# Patient Record
Sex: MALE | Race: WHITE | NOT HISPANIC OR LATINO | Employment: FULL TIME | URBAN - METROPOLITAN AREA
[De-identification: names, ages, dates, MRNs, and addresses within clinical notes are randomized per-mention and may not be internally consistent; named-entity substitution may affect disease eponyms.]

---

## 2023-04-17 ENCOUNTER — TELEPHONE (OUTPATIENT)
Dept: PRIMARY CARE | Facility: CLINIC | Age: 78
End: 2023-04-17
Payer: COMMERCIAL

## 2023-04-17 PROBLEM — I10 BENIGN ESSENTIAL HTN: Status: ACTIVE | Noted: 2023-04-17

## 2023-04-17 NOTE — TELEPHONE ENCOUNTER
Pt stopped into office this afternoon and needs a refill on-    Amlodipine Besylate- 10mg-3 90 days refills, 1 tablet by mouth daily     [facility] on [address] In [place]. [phone]  Thank you!

## 2023-04-18 ENCOUNTER — TELEPHONE (OUTPATIENT)
Dept: PRIMARY CARE | Facility: CLINIC | Age: 78
End: 2023-04-18
Payer: COMMERCIAL

## 2023-04-18 DIAGNOSIS — R80.9 MICROALBUMINURIA: ICD-10-CM

## 2023-04-18 DIAGNOSIS — I10 BENIGN ESSENTIAL HTN: Primary | ICD-10-CM

## 2023-04-18 RX ORDER — SILDENAFIL 50 MG/1
50 TABLET, FILM COATED ORAL AS NEEDED
COMMUNITY
Start: 2022-11-14 | End: 2023-06-19 | Stop reason: SDUPTHER

## 2023-04-18 RX ORDER — POTASSIUM CHLORIDE 750 MG/1
1 TABLET, EXTENDED RELEASE ORAL 2 TIMES DAILY
COMMUNITY
Start: 2023-01-12 | End: 2023-06-19 | Stop reason: SDUPTHER

## 2023-04-18 RX ORDER — VARDENAFIL HYDROCHLORIDE 10 MG/1
20 TABLET ORAL AS NEEDED
COMMUNITY

## 2023-04-18 RX ORDER — AMLODIPINE BESYLATE 10 MG/1
10 TABLET ORAL DAILY
Qty: 90 TABLET | Refills: 0 | Status: SHIPPED | OUTPATIENT
Start: 2023-04-18 | End: 2023-06-19 | Stop reason: SDUPTHER

## 2023-04-18 RX ORDER — LOSARTAN POTASSIUM 100 MG/1
100 TABLET ORAL DAILY
COMMUNITY
End: 2023-06-19 | Stop reason: SDUPTHER

## 2023-04-18 RX ORDER — HYDROCHLOROTHIAZIDE 25 MG/1
25 TABLET ORAL DAILY
COMMUNITY
End: 2023-06-19 | Stop reason: SDUPTHER

## 2023-04-18 RX ORDER — ATORVASTATIN CALCIUM 40 MG/1
40 TABLET, FILM COATED ORAL DAILY
COMMUNITY
End: 2023-05-02 | Stop reason: SDUPTHER

## 2023-04-18 RX ORDER — AMLODIPINE BESYLATE 10 MG/1
10 TABLET ORAL DAILY
COMMUNITY
End: 2023-04-18 | Stop reason: SDUPTHER

## 2023-04-18 NOTE — TELEPHONE ENCOUNTER
Summer, please schedule the pt for 30 minutes. He needs blood work done.  Schedule a follow up appointment per Lenin.Thanks

## 2023-04-19 NOTE — TELEPHONE ENCOUNTER
Pt notified of the message. He needs a follow up appointment.  I made a mistake with the note.   Summer, can you call to schedule him? Thanks.

## 2023-04-20 RX ORDER — AMLODIPINE BESYLATE 10 MG/1
10 TABLET ORAL DAILY
Qty: 90 TABLET | Refills: 0 | OUTPATIENT
Start: 2023-04-20

## 2023-04-20 NOTE — TELEPHONE ENCOUNTER
Can you sign the order that came across for this pt? I know th rx was sent but it  S coming up that it wasn't signed.

## 2023-05-01 DIAGNOSIS — E78.2 MIXED HYPERLIPIDEMIA: Primary | ICD-10-CM

## 2023-05-01 NOTE — TELEPHONE ENCOUNTER
Summer, can you call the pt to schedule him for an appointment? He needs bllodwork done.  Thanks.

## 2023-05-02 RX ORDER — ATORVASTATIN CALCIUM 40 MG/1
40 TABLET, FILM COATED ORAL DAILY
Qty: 30 TABLET | Refills: 1 | Status: SHIPPED | OUTPATIENT
Start: 2023-05-02 | End: 2023-06-19 | Stop reason: SDUPTHER

## 2023-05-17 ENCOUNTER — TELEPHONE (OUTPATIENT)
Dept: PRIMARY CARE | Facility: CLINIC | Age: 78
End: 2023-05-17
Payer: COMMERCIAL

## 2023-05-17 NOTE — TELEPHONE ENCOUNTER
Patient is going to [facility] and per his insurance it says he needs a referral Please.

## 2023-05-18 DIAGNOSIS — Z91.89 NEED FOR DENTAL CARE: Primary | ICD-10-CM

## 2023-06-06 ENCOUNTER — LAB (OUTPATIENT)
Dept: LAB | Facility: LAB | Age: 78
End: 2023-06-06
Payer: COMMERCIAL

## 2023-06-06 DIAGNOSIS — R80.9 MICROALBUMINURIA: ICD-10-CM

## 2023-06-06 DIAGNOSIS — I10 BENIGN ESSENTIAL HTN: ICD-10-CM

## 2023-06-06 LAB
ALANINE AMINOTRANSFERASE (SGPT) (U/L) IN SER/PLAS: 34 U/L (ref 10–52)
ALBUMIN (G/DL) IN SER/PLAS: 4.4 G/DL (ref 3.4–5)
ALBUMIN (MG/L) IN URINE: 408.4 MG/L
ALBUMIN/CREATININE (UG/MG) IN URINE: 337.5 UG/MG CRT (ref 0–30)
ALKALINE PHOSPHATASE (U/L) IN SER/PLAS: 63 U/L (ref 33–136)
ANION GAP IN SER/PLAS: 13 MMOL/L (ref 10–20)
ASPARTATE AMINOTRANSFERASE (SGOT) (U/L) IN SER/PLAS: 17 U/L (ref 9–39)
BILIRUBIN TOTAL (MG/DL) IN SER/PLAS: 1.7 MG/DL (ref 0–1.2)
CALCIUM (MG/DL) IN SER/PLAS: 10.2 MG/DL (ref 8.6–10.6)
CARBON DIOXIDE, TOTAL (MMOL/L) IN SER/PLAS: 27 MMOL/L (ref 21–32)
CHLORIDE (MMOL/L) IN SER/PLAS: 105 MMOL/L (ref 98–107)
CHOLESTEROL (MG/DL) IN SER/PLAS: 144 MG/DL (ref 0–199)
CHOLESTEROL IN HDL (MG/DL) IN SER/PLAS: 51.1 MG/DL
CHOLESTEROL/HDL RATIO: 2.8
CREATININE (MG/DL) IN SER/PLAS: 0.83 MG/DL (ref 0.5–1.3)
CREATININE (MG/DL) IN URINE: 121 MG/DL (ref 20–370)
GFR MALE: 90 ML/MIN/1.73M2
GLUCOSE (MG/DL) IN SER/PLAS: 87 MG/DL (ref 74–99)
LDL: 69 MG/DL (ref 0–99)
POTASSIUM (MMOL/L) IN SER/PLAS: 4 MMOL/L (ref 3.5–5.3)
PROTEIN TOTAL: 7 G/DL (ref 6.4–8.2)
SODIUM (MMOL/L) IN SER/PLAS: 141 MMOL/L (ref 136–145)
THYROTROPIN (MIU/L) IN SER/PLAS BY DETECTION LIMIT <= 0.05 MIU/L: 2.18 MIU/L (ref 0.44–3.98)
TRIGLYCERIDE (MG/DL) IN SER/PLAS: 120 MG/DL (ref 0–149)
UREA NITROGEN (MG/DL) IN SER/PLAS: 28 MG/DL (ref 6–23)
VLDL: 24 MG/DL (ref 0–40)

## 2023-06-06 PROCEDURE — 80053 COMPREHEN METABOLIC PANEL: CPT

## 2023-06-06 PROCEDURE — 82043 UR ALBUMIN QUANTITATIVE: CPT

## 2023-06-06 PROCEDURE — 36415 COLL VENOUS BLD VENIPUNCTURE: CPT

## 2023-06-06 PROCEDURE — 80061 LIPID PANEL: CPT

## 2023-06-06 PROCEDURE — 84443 ASSAY THYROID STIM HORMONE: CPT

## 2023-06-06 PROCEDURE — 82570 ASSAY OF URINE CREATININE: CPT

## 2023-06-19 ENCOUNTER — OFFICE VISIT (OUTPATIENT)
Dept: PRIMARY CARE | Facility: CLINIC | Age: 78
End: 2023-06-19
Payer: COMMERCIAL

## 2023-06-19 VITALS
SYSTOLIC BLOOD PRESSURE: 138 MMHG | BODY MASS INDEX: 31.21 KG/M2 | HEART RATE: 61 BPM | DIASTOLIC BLOOD PRESSURE: 72 MMHG | WEIGHT: 218 LBS | HEIGHT: 70 IN

## 2023-06-19 DIAGNOSIS — Z01.01: Primary | ICD-10-CM

## 2023-06-19 DIAGNOSIS — M79.672 FOOT PAIN, LEFT: ICD-10-CM

## 2023-06-19 DIAGNOSIS — Z01.00 EYE EXAM, ROUTINE: ICD-10-CM

## 2023-06-19 DIAGNOSIS — E78.2 MIXED HYPERLIPIDEMIA: ICD-10-CM

## 2023-06-19 DIAGNOSIS — E53.8 B12 DEFICIENCY: ICD-10-CM

## 2023-06-19 DIAGNOSIS — M79.5 FOREIGN BODY (FB) IN SOFT TISSUE: ICD-10-CM

## 2023-06-19 DIAGNOSIS — I10 BENIGN ESSENTIAL HTN: ICD-10-CM

## 2023-06-19 DIAGNOSIS — N52.9 VASCULOGENIC ERECTILE DYSFUNCTION, UNSPECIFIED VASCULOGENIC ERECTILE DYSFUNCTION TYPE: ICD-10-CM

## 2023-06-19 DIAGNOSIS — I10 BENIGN HYPERTENSION: ICD-10-CM

## 2023-06-19 PROCEDURE — 3078F DIAST BP <80 MM HG: CPT | Performed by: INTERNAL MEDICINE

## 2023-06-19 PROCEDURE — 1159F MED LIST DOCD IN RCRD: CPT | Performed by: INTERNAL MEDICINE

## 2023-06-19 PROCEDURE — 3075F SYST BP GE 130 - 139MM HG: CPT | Performed by: INTERNAL MEDICINE

## 2023-06-19 PROCEDURE — 1036F TOBACCO NON-USER: CPT | Performed by: INTERNAL MEDICINE

## 2023-06-19 PROCEDURE — 99214 OFFICE O/P EST MOD 30 MIN: CPT | Performed by: INTERNAL MEDICINE

## 2023-06-19 RX ORDER — SILDENAFIL 50 MG/1
50 TABLET, FILM COATED ORAL AS NEEDED
Qty: 30 TABLET | Refills: 3 | Status: SHIPPED | OUTPATIENT
Start: 2023-06-19 | End: 2024-06-07 | Stop reason: SDUPTHER

## 2023-06-19 RX ORDER — LOSARTAN POTASSIUM 100 MG/1
100 TABLET ORAL DAILY
Qty: 90 TABLET | Refills: 3 | Status: SHIPPED | OUTPATIENT
Start: 2023-06-19 | End: 2024-06-07 | Stop reason: SDUPTHER

## 2023-06-19 RX ORDER — POTASSIUM CHLORIDE 750 MG/1
10 TABLET, EXTENDED RELEASE ORAL 2 TIMES DAILY
Qty: 180 TABLET | Refills: 3 | Status: SHIPPED | OUTPATIENT
Start: 2023-06-19

## 2023-06-19 RX ORDER — AMLODIPINE BESYLATE 10 MG/1
10 TABLET ORAL DAILY
Qty: 90 TABLET | Refills: 3 | Status: SHIPPED | OUTPATIENT
Start: 2023-06-19 | End: 2024-06-07 | Stop reason: SDUPTHER

## 2023-06-19 RX ORDER — ASPIRIN 81 MG/1
81 TABLET ORAL ONCE
COMMUNITY

## 2023-06-19 RX ORDER — HYDROCHLOROTHIAZIDE 25 MG/1
25 TABLET ORAL DAILY
Qty: 90 TABLET | Refills: 3 | Status: SHIPPED | OUTPATIENT
Start: 2023-06-19 | End: 2024-06-07 | Stop reason: SDUPTHER

## 2023-06-19 RX ORDER — ATORVASTATIN CALCIUM 40 MG/1
40 TABLET, FILM COATED ORAL DAILY
Qty: 90 TABLET | Refills: 3 | Status: SHIPPED | OUTPATIENT
Start: 2023-06-19 | End: 2024-06-07 | Stop reason: SDUPTHER

## 2023-06-19 ASSESSMENT — PATIENT HEALTH QUESTIONNAIRE - PHQ9
1. LITTLE INTEREST OR PLEASURE IN DOING THINGS: NOT AT ALL
SUM OF ALL RESPONSES TO PHQ9 QUESTIONS 1 AND 2: 0
2. FEELING DOWN, DEPRESSED OR HOPELESS: NOT AT ALL

## 2023-06-19 ASSESSMENT — PAIN SCALES - GENERAL: PAINLEVEL: 0-NO PAIN

## 2023-06-19 NOTE — PROGRESS NOTES
"Subjective   Connor Song is a 77 y.o. male who presents for Follow-up (Follow up, meds).      He has lost weight was 243 ( he states his weight at home was lower)  His goal is 205.    He has protienuria is almost the same as last time    He occasionally takes gabapentin 300 for leg pain   He has been feeling well overall    He says he broke a glass and he got up barefoot to  the glass  He feels like there is something in his left foot near the lateral metatarsals  He would like a xray as he is still having pain at times    Review of Systems    Objective   /72 (BP Location: Right leg, Patient Position: Sitting, BP Cuff Size: Adult)   Pulse 61   Ht 1.778 m (5' 10\")   Wt 98.9 kg (218 lb)   BMI 31.28 kg/m²    Physical Exam  Visit Vitals  /72 (BP Location: Right leg, Patient Position: Sitting, BP Cuff Size: Adult)   Pulse 61   Ht 1.778 m (5' 10\")   Wt 98.9 kg (218 lb)   BMI 31.28 kg/m²   Smoking Status Never   BSA 2.21 m²      GEN: NAD  HEENT: normal  NECK: no adenopathy, no thyroid enlargment  LUNGS: CTAB  CV: reg S1/S2 no murmurs  EXT: no leg edema   Assessment/Plan   Problem List Items Addressed This Visit       Benign essential HTN    Relevant Medications    amLODIPine (Norvasc) 10 mg tablet          Foot pain, left        Relevant Orders    XR foot left 3+ views    Foreign body (FB) in soft tissue        Relevant Orders    XR foot left 3+ views    Benign hypertension    remain on current meds. BP well controlled.     Relevant Medications    potassium chloride CR 10 mEq ER tablet    losartan (Cozaar) 100 mg tablet    hydroCHLOROthiazide (HYDRODiuril) 25 mg tablet    Other Relevant Orders    Comprehensive metabolic panel    Lipid Panel    CBC and Auto Differential    B12 deficiency    he has a borderline low vitamin B12 level. Will repeat     Relevant Orders    Vitamin B12    Mixed hyperlipidemia    : remain on atorvastatin. Repeat lipids in Dec.     Relevant Medications    atorvastatin " (Lipitor) 40 mg tablet    Vasculogenic erectile dysfunction, unspecified vasculogenic erectile dysfunction type        Relevant Medications    sildenafil (Viagra) 50 mg tablet    He would like an eye exam so referral to ophtho completed.

## 2023-06-19 NOTE — PATIENT INSTRUCTIONS
See me again in December .  Will be your Medicare Wellness Visit.  Get blood test done after fasting close to next visit.

## 2023-07-20 ENCOUNTER — TELEPHONE (OUTPATIENT)
Dept: PRIMARY CARE | Facility: CLINIC | Age: 78
End: 2023-07-20
Payer: COMMERCIAL

## 2023-07-20 DIAGNOSIS — M54.16 LUMBAR RADICULOPATHY: Primary | ICD-10-CM

## 2023-07-20 NOTE — TELEPHONE ENCOUNTER
Pt needs a refill on Gabapentin 300 mg 1 capsule twice a day.   [facility] [address] In [place] [phone]  Pt # [phone]   Pt would like A 90 day supply.

## 2023-07-21 RX ORDER — GABAPENTIN 300 MG/1
300 CAPSULE ORAL 2 TIMES DAILY
Qty: 180 CAPSULE | Refills: 1 | Status: SHIPPED | OUTPATIENT
Start: 2023-07-21 | End: 2023-09-26 | Stop reason: SDUPTHER

## 2023-09-19 ENCOUNTER — OFFICE VISIT (OUTPATIENT)
Dept: PRIMARY CARE | Facility: CLINIC | Age: 78
End: 2023-09-19
Payer: COMMERCIAL

## 2023-09-19 VITALS
HEIGHT: 70 IN | SYSTOLIC BLOOD PRESSURE: 148 MMHG | WEIGHT: 225.8 LBS | BODY MASS INDEX: 32.33 KG/M2 | HEART RATE: 67 BPM | RESPIRATION RATE: 16 BRPM | DIASTOLIC BLOOD PRESSURE: 70 MMHG

## 2023-09-19 DIAGNOSIS — M65.351 TRIGGER LITTLE FINGER OF RIGHT HAND: Primary | ICD-10-CM

## 2023-09-19 DIAGNOSIS — M65.9 TENOSYNOVITIS OF FINGER: ICD-10-CM

## 2023-09-19 DIAGNOSIS — B07.0 PLANTAR WART: ICD-10-CM

## 2023-09-19 DIAGNOSIS — R80.9 ALBUMINURIA: ICD-10-CM

## 2023-09-19 DIAGNOSIS — Z00.00 ROUTINE GENERAL MEDICAL EXAMINATION AT HEALTH CARE FACILITY: ICD-10-CM

## 2023-09-19 DIAGNOSIS — Z13.89 ENCOUNTER FOR SCREENING FOR OTHER DISORDER: ICD-10-CM

## 2023-09-19 DIAGNOSIS — E78.2 MIXED HYPERLIPIDEMIA: ICD-10-CM

## 2023-09-19 DIAGNOSIS — I10 BENIGN ESSENTIAL HTN: ICD-10-CM

## 2023-09-19 PROBLEM — E78.5 HLD (HYPERLIPIDEMIA): Status: ACTIVE | Noted: 2023-09-19

## 2023-09-19 PROCEDURE — 3078F DIAST BP <80 MM HG: CPT | Performed by: INTERNAL MEDICINE

## 2023-09-19 PROCEDURE — 1036F TOBACCO NON-USER: CPT | Performed by: INTERNAL MEDICINE

## 2023-09-19 PROCEDURE — 1159F MED LIST DOCD IN RCRD: CPT | Performed by: INTERNAL MEDICINE

## 2023-09-19 PROCEDURE — G0439 PPPS, SUBSEQ VISIT: HCPCS | Performed by: INTERNAL MEDICINE

## 2023-09-19 PROCEDURE — 1126F AMNT PAIN NOTED NONE PRSNT: CPT | Performed by: INTERNAL MEDICINE

## 2023-09-19 PROCEDURE — 3077F SYST BP >= 140 MM HG: CPT | Performed by: INTERNAL MEDICINE

## 2023-09-19 PROCEDURE — 1160F RVW MEDS BY RX/DR IN RCRD: CPT | Performed by: INTERNAL MEDICINE

## 2023-09-19 PROCEDURE — 1170F FXNL STATUS ASSESSED: CPT | Performed by: INTERNAL MEDICINE

## 2023-09-19 PROCEDURE — 99213 OFFICE O/P EST LOW 20 MIN: CPT | Performed by: INTERNAL MEDICINE

## 2023-09-19 PROCEDURE — G0442 ANNUAL ALCOHOL SCREEN 15 MIN: HCPCS | Performed by: INTERNAL MEDICINE

## 2023-09-19 ASSESSMENT — ACTIVITIES OF DAILY LIVING (ADL)
DOING_HOUSEWORK: INDEPENDENT
GROCERY_SHOPPING: INDEPENDENT
DRESSING: INDEPENDENT
TAKING_MEDICATION: INDEPENDENT
MANAGING_FINANCES: INDEPENDENT
BATHING: INDEPENDENT

## 2023-09-19 ASSESSMENT — LIFESTYLE VARIABLES
HOW OFTEN DO YOU HAVE A DRINK CONTAINING ALCOHOL: MONTHLY OR LESS
HOW MANY STANDARD DRINKS CONTAINING ALCOHOL DO YOU HAVE ON A TYPICAL DAY: 1 OR 2
SKIP TO QUESTIONS 9-10: 1
HOW OFTEN DO YOU HAVE SIX OR MORE DRINKS ON ONE OCCASION: NEVER
AUDIT-C TOTAL SCORE: 1

## 2023-09-19 ASSESSMENT — PATIENT HEALTH QUESTIONNAIRE - PHQ9
SUM OF ALL RESPONSES TO PHQ9 QUESTIONS 1 AND 2: 0
1. LITTLE INTEREST OR PLEASURE IN DOING THINGS: NOT AT ALL
2. FEELING DOWN, DEPRESSED OR HOPELESS: NOT AT ALL

## 2023-09-19 ASSESSMENT — PAIN SCALES - GENERAL: PAINLEVEL: 0-NO PAIN

## 2023-09-19 NOTE — PROGRESS NOTES
"Subjective   Connor Song is a 78 y.o. male who presents for Follow-up (Pt has complaint of hard lump on the bottom of left foot. Xray was normal. Has a small raised area on his head possibly a pimple. ). Also can complete Medicare Wellness visit today    He had a hard lump at the top of his scalp that has improved but     His right index finger is locking, not closing   He had surgery for dupuytren's contracture in his left hand    His left foot has a tender area near the MTP of his great toe  Has had no drainage    He says that rossy has been needing my authorization to dispense refills even when there are refills on his meds.     He does have a HCPOA    He is leaving for [place] and will not be back until end November  Review of Systems    Objective   /70 (BP Location: Left arm, Patient Position: Sitting, BP Cuff Size: Adult)   Pulse 67   Resp 16   Ht 1.778 m (5' 10\")   Wt 102 kg (225 lb 12.8 oz)   BMI 32.40 kg/m²    Physical Exam  Visit Vitals  /70 (BP Location: Left arm, Patient Position: Sitting, BP Cuff Size: Adult)   Pulse 67   Resp 16   Ht 1.778 m (5' 10\")   Wt 102 kg (225 lb 12.8 oz)   BMI 32.40 kg/m²   Smoking Status Never   BSA 2.24 m²      GEN: NAD  HEENT: normal  NECK: no adenopathy, no thyroid enlargment  LUNGS: CTAB  CV: reg S1/S2 no murmurs  EXT: no leg edema   He has a wart on the sole of his left foot, in the left distal metatarsal area  There is no drainage, ulceration or  cellulitis  Assessment/Plan   Problem List Items Addressed This Visit       Benign essential HTN remain on amlodipine 10 mg , losartan 100     Relevant Orders    Comprehensive Metabolic Panel    Lipid Panel    CBC and Auto Differential    Albumin , Urine Random    Creatinine, Urine Random    Albuminuria    Relevant Orders    Albumin , Urine Random    Albumin , Urine Random    HLD (hyperlipidemia) remain on lipitor 40.     Relevant Orders    Lipid Panel     Other Visit Diagnoses       Trigger " little finger of right hand        Relevant Orders    Referral to Orthopaedic Surgery    Plantar Wart:  recommend discussing this with dermatologist.             Routine general medical examination at health care facility    medicare wellness visit completed today

## 2023-09-19 NOTE — PATIENT INSTRUCTIONS
Try using an otc wart remover on the area of your foot that is hurting.  Dermatology would also be able to address this.    See me again in 6 months.   Get blood test and urine test done close to next visit, after fasting.   Get blood test done after fasting overnight.  The  lab is on the first floor.  Lab hours are 7 am to 4 pm Mon-Fri and 8am to Noon on Saturday.  No appt is needed.  Orders are entered into the system so you do not need a paper order.

## 2023-09-26 ENCOUNTER — TELEPHONE (OUTPATIENT)
Dept: PRIMARY CARE | Facility: CLINIC | Age: 78
End: 2023-09-26
Payer: COMMERCIAL

## 2023-09-26 DIAGNOSIS — M54.16 LUMBAR RADICULOPATHY: ICD-10-CM

## 2023-09-26 RX ORDER — GABAPENTIN 300 MG/1
300 CAPSULE ORAL 2 TIMES DAILY
Qty: 180 CAPSULE | Refills: 3 | Status: SHIPPED | OUTPATIENT
Start: 2023-09-26 | End: 2024-05-28 | Stop reason: ALTCHOICE

## 2023-09-26 RX ORDER — GABAPENTIN 300 MG/1
300 CAPSULE ORAL 2 TIMES DAILY
Qty: 180 CAPSULE | Refills: 1 | Status: CANCELLED | OUTPATIENT
Start: 2023-09-26

## 2023-10-02 PROBLEM — N52.9 ERECTILE DYSFUNCTION: Status: ACTIVE | Noted: 2023-10-02

## 2023-10-02 PROBLEM — M54.16 LUMBAR RADICULAR PAIN: Status: ACTIVE | Noted: 2023-10-02

## 2023-11-14 ENCOUNTER — TELEPHONE (OUTPATIENT)
Dept: PRIMARY CARE | Facility: CLINIC | Age: 78
End: 2023-11-14
Payer: COMMERCIAL

## 2023-11-15 ENCOUNTER — APPOINTMENT (OUTPATIENT)
Dept: PRIMARY CARE | Facility: CLINIC | Age: 78
End: 2023-11-15
Payer: COMMERCIAL

## 2023-11-29 ENCOUNTER — APPOINTMENT (OUTPATIENT)
Dept: DERMATOLOGY | Facility: CLINIC | Age: 78
End: 2023-11-29
Payer: COMMERCIAL

## 2023-12-04 ENCOUNTER — APPOINTMENT (OUTPATIENT)
Dept: PRIMARY CARE | Facility: CLINIC | Age: 78
End: 2023-12-04
Payer: COMMERCIAL

## 2023-12-06 ENCOUNTER — LAB (OUTPATIENT)
Dept: LAB | Facility: LAB | Age: 78
End: 2023-12-06
Payer: COMMERCIAL

## 2023-12-06 DIAGNOSIS — R80.9 ALBUMINURIA: ICD-10-CM

## 2023-12-06 DIAGNOSIS — I10 BENIGN ESSENTIAL HTN: ICD-10-CM

## 2023-12-06 DIAGNOSIS — E53.8 B12 DEFICIENCY: ICD-10-CM

## 2023-12-06 DIAGNOSIS — E78.2 MIXED HYPERLIPIDEMIA: ICD-10-CM

## 2023-12-06 DIAGNOSIS — I10 BENIGN HYPERTENSION: ICD-10-CM

## 2023-12-06 LAB
ALBUMIN SERPL BCP-MCNC: 4.6 G/DL (ref 3.4–5)
ALP SERPL-CCNC: 66 U/L (ref 33–136)
ALT SERPL W P-5'-P-CCNC: 32 U/L (ref 10–52)
ANION GAP SERPL CALC-SCNC: 17 MMOL/L (ref 10–20)
AST SERPL W P-5'-P-CCNC: 18 U/L (ref 9–39)
BASOPHILS # BLD AUTO: 0.05 X10*3/UL (ref 0–0.1)
BASOPHILS NFR BLD AUTO: 0.8 %
BILIRUB SERPL-MCNC: 1.7 MG/DL (ref 0–1.2)
BUN SERPL-MCNC: 28 MG/DL (ref 6–23)
CALCIUM SERPL-MCNC: 10.3 MG/DL (ref 8.6–10.6)
CHLORIDE SERPL-SCNC: 102 MMOL/L (ref 98–107)
CHOLEST SERPL-MCNC: 168 MG/DL (ref 0–199)
CHOLESTEROL/HDL RATIO: 3
CO2 SERPL-SCNC: 27 MMOL/L (ref 21–32)
CREAT SERPL-MCNC: 0.95 MG/DL (ref 0.5–1.3)
CREAT UR-MCNC: 124.9 MG/DL (ref 20–370)
EOSINOPHIL # BLD AUTO: 0.18 X10*3/UL (ref 0–0.4)
EOSINOPHIL NFR BLD AUTO: 3 %
ERYTHROCYTE [DISTWIDTH] IN BLOOD BY AUTOMATED COUNT: 12.7 % (ref 11.5–14.5)
GFR SERPL CREATININE-BSD FRML MDRD: 82 ML/MIN/1.73M*2
GLUCOSE SERPL-MCNC: 87 MG/DL (ref 74–99)
HCT VFR BLD AUTO: 48.7 % (ref 41–52)
HDLC SERPL-MCNC: 56.7 MG/DL
HGB BLD-MCNC: 16.5 G/DL (ref 13.5–17.5)
IMM GRANULOCYTES # BLD AUTO: 0.02 X10*3/UL (ref 0–0.5)
IMM GRANULOCYTES NFR BLD AUTO: 0.3 % (ref 0–0.9)
LDLC SERPL CALC-MCNC: 82 MG/DL
LYMPHOCYTES # BLD AUTO: 1.71 X10*3/UL (ref 0.8–3)
LYMPHOCYTES NFR BLD AUTO: 28.1 %
MCH RBC QN AUTO: 30.6 PG (ref 26–34)
MCHC RBC AUTO-ENTMCNC: 33.9 G/DL (ref 32–36)
MCV RBC AUTO: 90 FL (ref 80–100)
MICROALBUMIN UR-MCNC: 571.4 MG/L
MICROALBUMIN/CREAT UR: 457.5 UG/MG CREAT
MONOCYTES # BLD AUTO: 0.68 X10*3/UL (ref 0.05–0.8)
MONOCYTES NFR BLD AUTO: 11.2 %
NEUTROPHILS # BLD AUTO: 3.44 X10*3/UL (ref 1.6–5.5)
NEUTROPHILS NFR BLD AUTO: 56.6 %
NON HDL CHOLESTEROL: 111 MG/DL (ref 0–149)
NRBC BLD-RTO: 0 /100 WBCS (ref 0–0)
PLATELET # BLD AUTO: 172 X10*3/UL (ref 150–450)
POTASSIUM SERPL-SCNC: 3.9 MMOL/L (ref 3.5–5.3)
PROT SERPL-MCNC: 7.2 G/DL (ref 6.4–8.2)
RBC # BLD AUTO: 5.4 X10*6/UL (ref 4.5–5.9)
SODIUM SERPL-SCNC: 142 MMOL/L (ref 136–145)
TRIGL SERPL-MCNC: 148 MG/DL (ref 0–149)
VIT B12 SERPL-MCNC: 339 PG/ML (ref 211–911)
VLDL: 30 MG/DL (ref 0–40)
WBC # BLD AUTO: 6.1 X10*3/UL (ref 4.4–11.3)

## 2023-12-06 PROCEDURE — 82607 VITAMIN B-12: CPT

## 2023-12-06 PROCEDURE — 36415 COLL VENOUS BLD VENIPUNCTURE: CPT

## 2023-12-06 PROCEDURE — 82043 UR ALBUMIN QUANTITATIVE: CPT

## 2023-12-06 PROCEDURE — 85025 COMPLETE CBC W/AUTO DIFF WBC: CPT

## 2023-12-06 PROCEDURE — 80061 LIPID PANEL: CPT

## 2023-12-06 PROCEDURE — 80053 COMPREHEN METABOLIC PANEL: CPT

## 2023-12-06 PROCEDURE — 82570 ASSAY OF URINE CREATININE: CPT

## 2023-12-22 ENCOUNTER — OFFICE VISIT (OUTPATIENT)
Dept: PRIMARY CARE | Facility: CLINIC | Age: 78
End: 2023-12-22
Payer: COMMERCIAL

## 2023-12-22 VITALS
SYSTOLIC BLOOD PRESSURE: 120 MMHG | WEIGHT: 227.2 LBS | BODY MASS INDEX: 32.53 KG/M2 | DIASTOLIC BLOOD PRESSURE: 67 MMHG | HEIGHT: 70 IN | HEART RATE: 82 BPM

## 2023-12-22 DIAGNOSIS — I10 BENIGN ESSENTIAL HTN: ICD-10-CM

## 2023-12-22 DIAGNOSIS — Z00.00 ROUTINE GENERAL MEDICAL EXAMINATION AT HEALTH CARE FACILITY: Primary | ICD-10-CM

## 2023-12-22 DIAGNOSIS — Z13.89 ENCOUNTER FOR SCREENING FOR OTHER DISORDER: ICD-10-CM

## 2023-12-22 DIAGNOSIS — E78.2 MIXED HYPERLIPIDEMIA: ICD-10-CM

## 2023-12-22 DIAGNOSIS — R80.9 ALBUMINURIA: ICD-10-CM

## 2023-12-22 PROCEDURE — G0442 ANNUAL ALCOHOL SCREEN 15 MIN: HCPCS | Performed by: INTERNAL MEDICINE

## 2023-12-22 PROCEDURE — 1159F MED LIST DOCD IN RCRD: CPT | Performed by: INTERNAL MEDICINE

## 2023-12-22 PROCEDURE — 3074F SYST BP LT 130 MM HG: CPT | Performed by: INTERNAL MEDICINE

## 2023-12-22 PROCEDURE — 1036F TOBACCO NON-USER: CPT | Performed by: INTERNAL MEDICINE

## 2023-12-22 PROCEDURE — 1170F FXNL STATUS ASSESSED: CPT | Performed by: INTERNAL MEDICINE

## 2023-12-22 PROCEDURE — 1126F AMNT PAIN NOTED NONE PRSNT: CPT | Performed by: INTERNAL MEDICINE

## 2023-12-22 PROCEDURE — 99397 PER PM REEVAL EST PAT 65+ YR: CPT | Performed by: INTERNAL MEDICINE

## 2023-12-22 PROCEDURE — 3078F DIAST BP <80 MM HG: CPT | Performed by: INTERNAL MEDICINE

## 2023-12-22 ASSESSMENT — ENCOUNTER SYMPTOMS
LOSS OF SENSATION IN FEET: 0
SHORTNESS OF BREATH: 0
ARTHRALGIAS: 1
OCCASIONAL FEELINGS OF UNSTEADINESS: 0
DIARRHEA: 0
ABDOMINAL PAIN: 0
CONSTIPATION: 0
DEPRESSION: 0

## 2023-12-22 ASSESSMENT — ACTIVITIES OF DAILY LIVING (ADL)
DOING_HOUSEWORK: INDEPENDENT
MANAGING_FINANCES: INDEPENDENT
BATHING: INDEPENDENT
TAKING_MEDICATION: INDEPENDENT
DRESSING: INDEPENDENT
GROCERY_SHOPPING: INDEPENDENT

## 2023-12-22 ASSESSMENT — LIFESTYLE VARIABLES
HOW MANY STANDARD DRINKS CONTAINING ALCOHOL DO YOU HAVE ON A TYPICAL DAY: 1 OR 2
HOW OFTEN DO YOU HAVE A DRINK CONTAINING ALCOHOL: 2-4 TIMES A MONTH
HOW OFTEN DO YOU HAVE SIX OR MORE DRINKS ON ONE OCCASION: NEVER
SKIP TO QUESTIONS 9-10: 1
AUDIT-C TOTAL SCORE: 2

## 2023-12-22 ASSESSMENT — PAIN SCALES - GENERAL: PAINLEVEL: 0-NO PAIN

## 2023-12-22 ASSESSMENT — PATIENT HEALTH QUESTIONNAIRE - PHQ9
2. FEELING DOWN, DEPRESSED OR HOPELESS: NOT AT ALL
SUM OF ALL RESPONSES TO PHQ9 QUESTIONS 1 AND 2: 0
1. LITTLE INTEREST OR PLEASURE IN DOING THINGS: NOT AT ALL

## 2023-12-22 NOTE — PATIENT INSTRUCTIONS
OK for Shingrix vaccine at your pharmacy.    Please try to find when your last pneumonia vaccine.   You can notify me by a message on [facility].    I will see you again in 6 month  Close to that visit, get blood test done after fasting overnight.  The  lab is on the first floor.  Lab hours are 7 am to 4 pm Mon-Fri and 8am to Noon on Saturday.  No appt is needed.  Orders are entered into the system so you do not need a paper order.

## 2023-12-22 NOTE — PROGRESS NOTES
"Subjective   Reason for Visit: Connor Song is an 78 y.o. male here for a Medicare Wellness visit.               HPI    He says he believes he had a pneumonia vaccine since he's been in [place].   Patient Care Team:  Sylvia Stratton DO as PCP - General     Review of Systems   Respiratory:  Negative for shortness of breath.    Cardiovascular:  Negative for chest pain.   Gastrointestinal:  Negative for abdominal pain, constipation and diarrhea.   Genitourinary:         He has more frequency. He says he will see Dr Mackay again about this. He had one prostate procedure, and no he's intersted in laser for prostate.    Musculoskeletal:  Positive for arthralgias.        He finds he aches all over with when he's sedentary; while he is  taking care of his property he feels well.   He has noted that his right pinky finger does not close completely when he makes a fist.  He had a trigger finger on his left hand that he had operated on over 10 years ago.  Right now he is able to close it on his right with effort.   It is not painful.        He does have a living will and DPOA.   Objective   Vitals:  /67   Pulse 82   Ht 1.778 m (5' 10\")   Wt 103 kg (227 lb 3.2 oz)   BMI 32.60 kg/m²       Physical Exam       GEN: NAD  HEENT: normal  NECK: no adenopathy, no thyroid enlargment  LUNGS: CTAB  CV: reg S1/S2 no murmurs  EXT: no leg edema   Assessment/Plan   Problem List Items Addressed This Visit       Benign essential HTN    Relevant Orders    Comprehensive Metabolic Panel    Lipid Panel    TSH with reflex to Free T4 if abnormal    CBC and Auto Differential    Albuminuria stabel.     HLD (hyperlipidemia)    Relevant Orders    Comprehensive Metabolic Panel    Lipid Panel     Other Visit Diagnoses       Routine general medical examination at health care facility    -  Primary  Medicare Wellness visit completed today. Recommend receiving Shingrix through local pharmacy.   See me in 6 mo.                "

## 2023-12-26 ENCOUNTER — APPOINTMENT (OUTPATIENT)
Dept: PRIMARY CARE | Facility: CLINIC | Age: 78
End: 2023-12-26
Payer: COMMERCIAL

## 2024-02-07 ENCOUNTER — OFFICE VISIT (OUTPATIENT)
Dept: DERMATOLOGY | Facility: CLINIC | Age: 79
End: 2024-02-07
Payer: COMMERCIAL

## 2024-02-07 DIAGNOSIS — L30.0 NUMMULAR DERMATITIS: ICD-10-CM

## 2024-02-07 DIAGNOSIS — L82.0 INFLAMED SEBORRHEIC KERATOSIS: Primary | ICD-10-CM

## 2024-02-07 DIAGNOSIS — L82.1 SEBORRHEIC KERATOSIS: ICD-10-CM

## 2024-02-07 PROCEDURE — 1126F AMNT PAIN NOTED NONE PRSNT: CPT | Performed by: DERMATOLOGY

## 2024-02-07 PROCEDURE — 17111 DESTRUCTION B9 LESIONS 15/>: CPT | Performed by: DERMATOLOGY

## 2024-02-07 PROCEDURE — 1160F RVW MEDS BY RX/DR IN RCRD: CPT | Performed by: DERMATOLOGY

## 2024-02-07 PROCEDURE — 1036F TOBACCO NON-USER: CPT | Performed by: DERMATOLOGY

## 2024-02-07 PROCEDURE — 1159F MED LIST DOCD IN RCRD: CPT | Performed by: DERMATOLOGY

## 2024-02-07 PROCEDURE — 99213 OFFICE O/P EST LOW 20 MIN: CPT | Performed by: DERMATOLOGY

## 2024-02-07 RX ORDER — TRIAMCINOLONE ACETONIDE 1 MG/G
OINTMENT TOPICAL
Qty: 80 G | Refills: 1 | Status: SHIPPED | OUTPATIENT
Start: 2024-02-07

## 2024-02-07 NOTE — PROGRESS NOTES
Subjective     Connor Song is a 78 y.o. male who presents for the following: Suspicious Skin Lesion (Left cheek, right medial eye, back lesions- pt states history of [facility]ms[facility] 1987- left lower leg and left forearm) and Dermatitis (B/l lower legs- pt states comes and goes but triamcinolone works good and pt is requesting refills ).     Review of Systems:  No other skin or systemic complaints other than what is documented elsewhere in the note.    The following portions of the chart were reviewed this encounter and updated as appropriate:   Tobacco  Allergies  Meds  Problems  Med Hx  Surg Hx  Fam Hx         Skin Cancer History  No skin cancer on file.      Specialty Problems    None       Objective   Well appearing patient in no apparent distress; mood and affect are within normal limits.    A focused skin examination was performed. All findings within normal limits unless otherwise noted below.    Assessment/Plan   1. Inflamed seborrheic keratosis (20)  Left Breast, Left Flank (5), Right Flank (4), Right Lower Back (4), Right Malar Cheek, Right Upper Back (5)  Stuck-on, waxy macule(s)/papule(s)/plaque(s) with comedo-like openings and milia-like cysts with surrounding erythema and crusting    -Patient requests cryotherapy today for these clinically inflamed lesions  -Possible side effects of liquid nitrogen treatment reviewed including formation of blisters, crusting, tenderness, scar, and discoloration which may be permanent.    Destr of lesion - Left Breast, Left Flank, Right Flank, Right Lower Back, Right Malar Cheek, Right Upper Back  Complexity: simple    Destruction method: cryotherapy    Informed consent: discussed and consent obtained    Lesion destroyed using liquid nitrogen: Yes    Outcome: patient tolerated procedure well with no complications      2. Seborrheic keratosis  Stuck on, waxy macule(s)/papule(s)/plaque(s) with comedo-like openings and milia like cysts    -Discussed the nature of the  diagnosis  -Reassurance, recommend continued observation    3. Nummular dermatitis  Left Lower Leg - Anterior, Right Lower Leg - Anterior  Clear today    -May continue triamcinolone ointment as needed for flares    -Discussed with/information given to the patient on the risks, benefits and alternatives of the usage of topical corticosteroids, including but not limited to: atrophy (thinning of the skin), striae (stretch marks), telangiectasia (blood vessel growth), and dyspigmentation (discoloration of the skin).  -Recommend to limit long-term use of topical corticosteroids to less than 14 days per month to reduce risk of side effects.      Related Medications  triamcinolone (Kenalog) 0.1 % ointment  Apply to affected areas twice daily when active as needed. Use less than 14 days per month.        Follow up in May 2024 for FSE  Discussed if there are any changes or development of concerning symptoms (lesion/skin condition is changing, bleeding, enlarging, or worsening) the patient is to contact my office. The patient verbalizes understanding.    Cherelle Chow MD  2/7/2024

## 2024-02-27 ENCOUNTER — OFFICE VISIT (OUTPATIENT)
Dept: UROLOGY | Facility: HOSPITAL | Age: 79
End: 2024-02-27
Payer: COMMERCIAL

## 2024-02-27 DIAGNOSIS — N40.1 BPH WITH OBSTRUCTION/LOWER URINARY TRACT SYMPTOMS: Primary | ICD-10-CM

## 2024-02-27 DIAGNOSIS — N52.9 ERECTILE DYSFUNCTION, UNSPECIFIED ERECTILE DYSFUNCTION TYPE: ICD-10-CM

## 2024-02-27 DIAGNOSIS — N13.8 BPH WITH OBSTRUCTION/LOWER URINARY TRACT SYMPTOMS: Primary | ICD-10-CM

## 2024-02-27 LAB
POC APPEARANCE, URINE: CLEAR
POC BILIRUBIN, URINE: NEGATIVE
POC BLOOD, URINE: NEGATIVE
POC COLOR, URINE: YELLOW
POC GLUCOSE, URINE: NEGATIVE MG/DL
POC KETONES, URINE: NEGATIVE MG/DL
POC LEUKOCYTES, URINE: NEGATIVE
POC NITRITE,URINE: NEGATIVE
POC PH, URINE: 6.5 PH
POC PROTEIN, URINE: ABNORMAL MG/DL
POC SPECIFIC GRAVITY, URINE: 1.02
POC UROBILINOGEN, URINE: 0.2 EU/DL

## 2024-02-27 PROCEDURE — 1036F TOBACCO NON-USER: CPT | Performed by: STUDENT IN AN ORGANIZED HEALTH CARE EDUCATION/TRAINING PROGRAM

## 2024-02-27 PROCEDURE — 99214 OFFICE O/P EST MOD 30 MIN: CPT | Performed by: STUDENT IN AN ORGANIZED HEALTH CARE EDUCATION/TRAINING PROGRAM

## 2024-02-27 PROCEDURE — 1160F RVW MEDS BY RX/DR IN RCRD: CPT | Performed by: STUDENT IN AN ORGANIZED HEALTH CARE EDUCATION/TRAINING PROGRAM

## 2024-02-27 PROCEDURE — 1159F MED LIST DOCD IN RCRD: CPT | Performed by: STUDENT IN AN ORGANIZED HEALTH CARE EDUCATION/TRAINING PROGRAM

## 2024-02-27 PROCEDURE — 1126F AMNT PAIN NOTED NONE PRSNT: CPT | Performed by: STUDENT IN AN ORGANIZED HEALTH CARE EDUCATION/TRAINING PROGRAM

## 2024-02-27 PROCEDURE — 81003 URINALYSIS AUTO W/O SCOPE: CPT | Mod: 91 | Performed by: STUDENT IN AN ORGANIZED HEALTH CARE EDUCATION/TRAINING PROGRAM

## 2024-02-27 RX ORDER — TAMSULOSIN HYDROCHLORIDE 0.4 MG/1
0.4 CAPSULE ORAL DAILY
Qty: 30 CAPSULE | Refills: 11 | Status: SHIPPED | OUTPATIENT
Start: 2024-02-27 | End: 2025-02-26

## 2024-02-27 NOTE — PROGRESS NOTES
Subjective   Patient ID: Connor Song is a 78 y.o. male    HPI  78 y.o. male who presents with a longstanding history of urinary frequency, approximately every two hours, and poor urinary flow. He reports these symptoms have been ongoing for about two years. He has a history of prostate surgery in 2015 in [place]. He also experiences erectile dysfunction and occasionally uses Levitra or Viagra with limited improvement. He was last evaluated by Dr. Mackay two years ago, which included a cystoscopy. He denies any use of caffeine except for iced tea, which he will switch to decaf as advised. He has not been on Flomax or Tamsulosin since before his first prostate surgery. He provided a urine sample today for culture to rule out infection.    The most recent urinalysis, conducted on 2/27/2024, revealed 2+ Protein     Review of Systems    All systems were reviewed. Anything negative was noted in the HPI.    Objective   Physical Exam    General: Well developed, well nourished, alert and cooperative, appears in no acute distress   Eyes: Non-injected conjunctiva, sclera clear, no proptosis   Cardiac: Extremities are warm and well perfused. No edema, cyanosis or pallor   Lungs: Breathing is easy, non-labored. Speaking in clear and complete sentences. Normal diaphragmatic movement   MSK: Ambulatory with steady gait, unassisted   Neuro: Alert and oriented to person, place, and time   Psych: Demonstrates good judgment and reason, without hallucinations, abnormal affect or abnormal behaviors   Skin: No obvious lesions, no rashes       No CVA tenderness bilaterally   No suprapubic pain or discomfort       No past medical history on file.      Past Surgical History:   Procedure Laterality Date    OTHER SURGICAL HISTORY  01/20/2022    Gallbladder surgery    OTHER SURGICAL HISTORY  01/20/2022    Hand surgery    OTHER SURGICAL HISTORY  07/25/2022    Back surgery    OTHER SURGICAL HISTORY  04/01/2022    Prostatectomy laser        Assessment/Plan   BPH    78 y.o. male who presents for the above condition, Today, we had a very long and extensive discussion with the patient regarding the pathophysiology, differential diagnosis, risk factor, associated condition, diagnostic work-up and management of BPH and lower urinary tract symptoms and acute urinary retention. I discussed with the patient the need to check his PSA to assess his prostate cancer risk. We discussed at length the mechanism of action, risk, benefit, adverse events and side effect of alpha-blocker in the form of tamsulosin 0.4 mg p.o nightly. We discussed in particular the risk of hypotension, lightheadedness, dizziness, and the risk of fall and bone fracture. Also discussed retrograde ejaculation of the side effects of the medication. We had another discussion with the patient regarding lifestyle modifications including low fluid intake after 5 PM, timed voiding every 2 hours, and decrease caffeine intake. I discussed with the patient that in case he had an elevated PSA, we will proceed do an MRI of the prostate.      Plan:  - Lifestyle modification  - Flomax 0.4 mg/day  - Urine for culture  - Renal US  - Cystoscopy in 3 weeks          Scribe Attestation  By signing my name below, I, Rui Peguero   attest that this documentation has been prepared under the direction and in the presence of Dr. Mark Osorio

## 2024-03-12 ENCOUNTER — HOSPITAL ENCOUNTER (OUTPATIENT)
Dept: RADIOLOGY | Facility: HOSPITAL | Age: 79
Discharge: HOME | End: 2024-03-12
Payer: COMMERCIAL

## 2024-03-12 DIAGNOSIS — N13.8 BPH WITH OBSTRUCTION/LOWER URINARY TRACT SYMPTOMS: ICD-10-CM

## 2024-03-12 DIAGNOSIS — N40.1 BPH WITH OBSTRUCTION/LOWER URINARY TRACT SYMPTOMS: ICD-10-CM

## 2024-03-12 PROCEDURE — 76770 US EXAM ABDO BACK WALL COMP: CPT

## 2024-03-12 PROCEDURE — 76770 US EXAM ABDO BACK WALL COMP: CPT | Performed by: RADIOLOGY

## 2024-04-16 ENCOUNTER — PROCEDURE VISIT (OUTPATIENT)
Dept: UROLOGY | Facility: HOSPITAL | Age: 79
End: 2024-04-16
Payer: COMMERCIAL

## 2024-04-16 DIAGNOSIS — Z79.2 PROPHYLACTIC ANTIBIOTIC: Primary | ICD-10-CM

## 2024-04-16 DIAGNOSIS — N13.8 BPH WITH OBSTRUCTION/LOWER URINARY TRACT SYMPTOMS: ICD-10-CM

## 2024-04-16 DIAGNOSIS — N40.1 BPH WITH OBSTRUCTION/LOWER URINARY TRACT SYMPTOMS: ICD-10-CM

## 2024-04-16 LAB
POC APPEARANCE, URINE: CLEAR
POC BILIRUBIN, URINE: NEGATIVE
POC BLOOD, URINE: NEGATIVE
POC COLOR, URINE: YELLOW
POC GLUCOSE, URINE: NEGATIVE MG/DL
POC KETONES, URINE: NEGATIVE MG/DL
POC LEUKOCYTES, URINE: NEGATIVE
POC NITRITE,URINE: NEGATIVE
POC PH, URINE: 5.5 PH
POC PROTEIN, URINE: NORMAL MG/DL
POC SPECIFIC GRAVITY, URINE: >=1.03
POC UROBILINOGEN, URINE: 0.2 EU/DL

## 2024-04-16 PROCEDURE — 52000 CYSTOURETHROSCOPY: CPT | Performed by: STUDENT IN AN ORGANIZED HEALTH CARE EDUCATION/TRAINING PROGRAM

## 2024-04-16 PROCEDURE — 99213 OFFICE O/P EST LOW 20 MIN: CPT | Performed by: STUDENT IN AN ORGANIZED HEALTH CARE EDUCATION/TRAINING PROGRAM

## 2024-04-16 PROCEDURE — 81003 URINALYSIS AUTO W/O SCOPE: CPT | Performed by: STUDENT IN AN ORGANIZED HEALTH CARE EDUCATION/TRAINING PROGRAM

## 2024-04-16 RX ORDER — CIPROFLOXACIN 500 MG/1
500 TABLET ORAL ONCE
Status: SHIPPED | OUTPATIENT
Start: 2024-04-16

## 2024-04-16 NOTE — PROGRESS NOTES
Patient ID: Connor Song is a 78 y.o. male.    Cystoscopy    Date/Time: 4/16/2024 11:28 AM    Performed by: Mark Osorio MD MPH  Authorized by: Mark Osorio MD MPH    Procedure - Bladder Cystoscopy:     Procedure details: cystoscopy    PROCEDURE NOTE:    PREOPERATIVE DIAGNOSIS:  LUTS    POSTOPERATIVE DIAGNOSIS:  Same    OPERATION:  Flexible Cystourethroscopy      SURGEON:  Mark Osorio MD MPH    ANESTHESIA:  2%  lidocaine jelly    COMPLICATIONS:  None    EBL: Minimal        DISPOSITION:  The patient was discharged home after the procedure, per routine.    INDICATIONS: :  Mr. Song is a 78 y.o. patient with a history of LUTs who presents today for Cystoscopy.     The indications, risks and benefits of this procedure were discussed with the patient, consent was obtained prior to the procedure, and to the best of my judgement the patient seemed to understand and agree to the procedure.    PROCEDURE:  The patient  was brought into the procedure suite and informed consent was reviewed and confirmed. Vital signs were obtained prior to the procedure: There were no vitals taken for this visit..  The patient was escorted onto the stretcher, placed supine, prepped with betadine and draped in the usual standard surgical fashion.  Intraurethral 2% viscous lidocaine jelly was used for local analgesia.  A 16 Hebrew flexible cystourethroscope was inserted into the urethra.   The penile urethra was normal.  The prostate urethra was enlarged.  Upon entering the bladder the entire bladder was surveyed in a 360 degree fashion.  The left and right ureteral orifices were in normal orthotopic position effluxing clear yellow urine, bilaterally.   There was no evidence of any bladder lesions, foreign objects, stones or evidence of any mucosal changes. The cystoscope was then retroflexed.  The bladder neck was then further examined without any evidence of lesions. The scope was then removed and in an antegrade fashion, the  urethra and bladder were again resurveyed with no evidence of additional lesions.  The cystoscope was then fully removed.   The patient tolerated the procedure well.  Vitals were stable after the procedure.  The patient was able to void and was discharged home.  Verbal and written Post procedure instructions were reviewed with the patient.    IMPRESSION:  Recurrence of BPH    PLAN:  Flomax Vs re-Turp discussed

## 2024-04-16 NOTE — PROGRESS NOTES
Subjective   Patient ID: Connor Song is a 78 y.o. male    HPI  78 y.o. male who presents for cystoscopic follow up after a longstanding history of urinary frequency, approximately every two hours, and poor urinary flow. He reports these symptoms have been ongoing for about two years. He has a history of prostate surgery in 2015 in [place]. He also experiences erectile dysfunction and occasionally uses Levitra or Viagra with limited improvement. He was last evaluated by Dr. Mackay two years ago, which included a cystoscopy. He denies any use of caffeine except for iced tea, which he will switch to decaf as advised. He has not been on Flomax or Tamsulosin since before his first prostate surgery. He provided a urine sample today for culture to rule out infection.    The most recent Renal US, conducted on 3/12/2024, revealed:  - Left renal cysts.    - Increased bilateral renal cortical echogenicity suggestive of medical renal disease.  - Prostatomegaly. Bladder wall thickening with elevated postvoid residual suggesting chronic bladder outlet obstruction.        Review of Systems    All systems were reviewed. Anything negative was noted in the HPI.    Objective   Physical Exam    General: Well developed, well nourished, alert and cooperative, appears in no acute distress   Eyes: Non-injected conjunctiva, sclera clear, no proptosis   Cardiac: Extremities are warm and well perfused. No edema, cyanosis or pallor   Lungs: Breathing is easy, non-labored. Speaking in clear and complete sentences. Normal diaphragmatic movement   MSK: Ambulatory with steady gait, unassisted   Neuro: Alert and oriented to person, place, and time   Psych: Demonstrates good judgment and reason, without hallucinations, abnormal affect or abnormal behaviors   Skin: No obvious lesions, no rashes       No CVA tenderness bilaterally   No suprapubic pain or discomfort       No past medical history on file.      Past Surgical History:   Procedure  Laterality Date    OTHER SURGICAL HISTORY  01/20/2022    Gallbladder surgery    OTHER SURGICAL HISTORY  01/20/2022    Hand surgery    OTHER SURGICAL HISTORY  07/25/2022    Back surgery    OTHER SURGICAL HISTORY  04/01/2022    Prostatectomy laser     Procedure:  The patient was prepped using a Betadine solution. Lidocaine jelly was instilled into the urethra. The flexible cystoscope was sterilely inserted into the urethra and formal cystoscopy performed in a systematic fashion. For detailed findings of the procedure, please see Dr. Osorio’s remarks below  Scope A used, Cipro 500 mg p.o. given    Assessment/Plan   Cystoscopic evaluation for BPH    78 y.o. male who presents for the above condition, Today, we had a very long and extensive discussion with the patient regarding the pathophysiology, differential diagnosis, risk factor, associated condition, diagnostic work-up and management of BPH and lower urinary tract symptoms and acute urinary retention. I discussed with the patient the need to check his PSA to assess his prostate cancer risk. We discussed at length the mechanism of action, risk, benefit, adverse events and side effect of alpha-blocker in the form of tamsulosin 0.4 mg p.o nightly. We discussed in particular the risk of hypotension, lightheadedness, dizziness, and the risk of fall and bone fracture. Also discussed retrograde ejaculation of the side effects of the medication. We had another discussion with the patient regarding lifestyle modifications including low fluid intake after 5 PM, timed voiding every 2 hours, and decrease caffeine intake. I discussed with the patient that in case he had an elevated PSA, we will proceed do an MRI of the prostate.      Pt advised to continue current regimen of Flomax    Plan:  - Follow up in 6 months          Scribe Attestation  By signing my name below, IScott Scribe   attest that this documentation has been prepared under the direction and in the  presence of Dr. Mark Osorio

## 2024-05-02 ENCOUNTER — TELEPHONE (OUTPATIENT)
Dept: PRIMARY CARE | Facility: CLINIC | Age: 79
End: 2024-05-02

## 2024-05-02 NOTE — TELEPHONE ENCOUNTER
Patient is calling asking for a new ortho referral . Drove his car for 13 hours, and can hardly move.  Has done this before. Can a new one be reset. Aware doctor not in till Monday.

## 2024-05-28 ENCOUNTER — OFFICE VISIT (OUTPATIENT)
Dept: PRIMARY CARE | Facility: CLINIC | Age: 79
End: 2024-05-28
Payer: COMMERCIAL

## 2024-05-28 VITALS
WEIGHT: 227.8 LBS | DIASTOLIC BLOOD PRESSURE: 87 MMHG | HEART RATE: 80 BPM | HEIGHT: 71 IN | BODY MASS INDEX: 31.89 KG/M2 | SYSTOLIC BLOOD PRESSURE: 142 MMHG | RESPIRATION RATE: 16 BRPM

## 2024-05-28 DIAGNOSIS — R80.9 ALBUMINURIA: ICD-10-CM

## 2024-05-28 DIAGNOSIS — I10 BENIGN ESSENTIAL HTN: Primary | ICD-10-CM

## 2024-05-28 DIAGNOSIS — E55.9 VITAMIN D DEFICIENCY: ICD-10-CM

## 2024-05-28 DIAGNOSIS — N52.9 VASCULOGENIC ERECTILE DYSFUNCTION, UNSPECIFIED VASCULOGENIC ERECTILE DYSFUNCTION TYPE: ICD-10-CM

## 2024-05-28 DIAGNOSIS — E78.2 MIXED HYPERLIPIDEMIA: ICD-10-CM

## 2024-05-28 PROCEDURE — 99214 OFFICE O/P EST MOD 30 MIN: CPT | Performed by: INTERNAL MEDICINE

## 2024-05-28 PROCEDURE — 3079F DIAST BP 80-89 MM HG: CPT | Performed by: INTERNAL MEDICINE

## 2024-05-28 PROCEDURE — 1126F AMNT PAIN NOTED NONE PRSNT: CPT | Performed by: INTERNAL MEDICINE

## 2024-05-28 PROCEDURE — 3077F SYST BP >= 140 MM HG: CPT | Performed by: INTERNAL MEDICINE

## 2024-05-28 PROCEDURE — 1159F MED LIST DOCD IN RCRD: CPT | Performed by: INTERNAL MEDICINE

## 2024-05-28 ASSESSMENT — ENCOUNTER SYMPTOMS
FEVER: 0
NAIL CHANGES: 0
COUGH: 0
RHINORRHEA: 0
VOMITING: 0
DIARRHEA: 0
SHORTNESS OF BREATH: 0
FATIGUE: 0
EYE PAIN: 0
SORE THROAT: 0
ANOREXIA: 0

## 2024-05-28 ASSESSMENT — PAIN SCALES - GENERAL: PAINLEVEL: 0-NO PAIN

## 2024-05-28 NOTE — PATIENT INSTRUCTIONS
Talk to your dermatologist about the spot on your scalp.    See me again in September for a 45 min Medicare Wellness visit.     Get blood and urine test done after fasting close to your Wellness Visit.

## 2024-05-28 NOTE — PROGRESS NOTES
"Answers submitted by the patient for this visit:  Rash Questionnaire (Submitted on 2024)  Chief Complaint: Rash  Chronicity: new  Onset: 1 to 4 weeks ago  Progression since onset: gradually worsening  Affected locations: scalp  Characteristics: burning, itchiness  Exposed to: unknown  anorexia: No  facial edema: No  joint pain: No  nail changes: No  Subjective   Connor Song is a 78 y.o. male who presents for Follow-up.    He has an itchy bump under his scalp on his left side above his ear and he wants to know what it is.    He stopped taking gabapentin because he was having joint achiness, and he says that the achiness resolved after he stopped taking gabapentin.    He is going to see rheumatology in early     Review of Systems   Constitutional:  Negative for fatigue and fever.   HENT:  Negative for congestion, rhinorrhea and sore throat.    Eyes:  Negative for pain.   Respiratory:  Negative for cough and shortness of breath.    Cardiovascular:  Negative for chest pain and leg swelling.   Gastrointestinal:  Negative for diarrhea and vomiting.   Skin:  Positive for rash.     His brother  2 weeks ago, so he drove to [place]   He had back stiffness and pain  [facility] which [facility]  He still has some sciatica and some dull ache that is persistent       Objective   /87   Pulse 80   Resp 16   Ht 1.803 m (5' 11\")   Wt 103 kg (227 lb 12.8 oz)   BMI 31.77 kg/m²    Physical Exam  Visit Vitals  /87   Pulse 80   Resp 16   Ht 1.803 m (5' 11\")   Wt 103 kg (227 lb 12.8 oz)   BMI 31.77 kg/m²   Smoking Status Never   BSA 2.27 m²      GEN: NAD  HEENT: normal  NECK: no adenopathy, no thyroid enlargment  LUNGS: CTAB  CV: reg S1/S2 no murmurs  EXT: no leg edema   Assessment/Plan   Problem List Items Addressed This Visit       Benign essential HTN - Primary    Relevant Orders    Comprehensive Metabolic Panel    Lipid Panel    TSH with reflex to Free T4 if " abnormal    CBC and Auto Differential    Albuminuria    Relevant Orders    Comprehensive Metabolic Panel    Lipid Panel    CBC and Auto Differential    Albumin , Urine Random    Creatinine, Urine Random    HLD (hyperlipidemia)    Relevant Orders    Comprehensive Metabolic Panel    Lipid Panel    Erectile dysfunction     Other Visit Diagnoses       Vitamin D deficiency        Relevant Orders    Vitamin D 25-Hydroxy,Total (for eval of Vitamin D levels)

## 2024-06-03 ENCOUNTER — OFFICE VISIT (OUTPATIENT)
Dept: RHEUMATOLOGY | Facility: CLINIC | Age: 79
End: 2024-06-03
Payer: COMMERCIAL

## 2024-06-03 VITALS — BODY MASS INDEX: 31.66 KG/M2 | DIASTOLIC BLOOD PRESSURE: 84 MMHG | SYSTOLIC BLOOD PRESSURE: 127 MMHG | WEIGHT: 227 LBS

## 2024-06-03 DIAGNOSIS — M15.9 PRIMARY OSTEOARTHRITIS INVOLVING MULTIPLE JOINTS: Primary | ICD-10-CM

## 2024-06-03 DIAGNOSIS — M54.16 LUMBAR RADICULAR PAIN: ICD-10-CM

## 2024-06-03 PROCEDURE — 3074F SYST BP LT 130 MM HG: CPT | Performed by: INTERNAL MEDICINE

## 2024-06-03 PROCEDURE — 1036F TOBACCO NON-USER: CPT | Performed by: INTERNAL MEDICINE

## 2024-06-03 PROCEDURE — 1159F MED LIST DOCD IN RCRD: CPT | Performed by: INTERNAL MEDICINE

## 2024-06-03 PROCEDURE — 3079F DIAST BP 80-89 MM HG: CPT | Performed by: INTERNAL MEDICINE

## 2024-06-03 PROCEDURE — 99204 OFFICE O/P NEW MOD 45 MIN: CPT | Performed by: INTERNAL MEDICINE
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
## 2024-06-06 DIAGNOSIS — E78.2 MIXED HYPERLIPIDEMIA: ICD-10-CM

## 2024-06-06 DIAGNOSIS — I10 BENIGN HYPERTENSION: ICD-10-CM

## 2024-06-06 DIAGNOSIS — I10 BENIGN ESSENTIAL HTN: ICD-10-CM

## 2024-06-06 DIAGNOSIS — N52.9 VASCULOGENIC ERECTILE DYSFUNCTION, UNSPECIFIED VASCULOGENIC ERECTILE DYSFUNCTION TYPE: ICD-10-CM

## 2024-06-06 NOTE — TELEPHONE ENCOUNTER
Patient calling needing refills;  Atorvastatin  40 mg.  Amlodipine 10 mg.  Losartan 100 mg.  Potassium chloride cr 10 meq er tabs..  Hydrochlorothyazide 25 mg.  Sildenafil 50 mg.   Delilah estrada

## 2024-06-07 RX ORDER — HYDROCHLOROTHIAZIDE 25 MG/1
25 TABLET ORAL DAILY
Qty: 90 TABLET | Refills: 3 | Status: SHIPPED | OUTPATIENT
Start: 2024-06-07

## 2024-06-07 RX ORDER — ATORVASTATIN CALCIUM 40 MG/1
40 TABLET, FILM COATED ORAL DAILY
Qty: 90 TABLET | Refills: 3 | Status: SHIPPED | OUTPATIENT
Start: 2024-06-07

## 2024-06-07 RX ORDER — SILDENAFIL 50 MG/1
50 TABLET, FILM COATED ORAL AS NEEDED
Qty: 30 TABLET | Refills: 3 | Status: SHIPPED | OUTPATIENT
Start: 2024-06-07

## 2024-06-07 RX ORDER — AMLODIPINE BESYLATE 10 MG/1
10 TABLET ORAL DAILY
Qty: 90 TABLET | Refills: 3 | Status: SHIPPED | OUTPATIENT
Start: 2024-06-07

## 2024-06-07 RX ORDER — LOSARTAN POTASSIUM 100 MG/1
100 TABLET ORAL DAILY
Qty: 90 TABLET | Refills: 3 | Status: SHIPPED | OUTPATIENT
Start: 2024-06-07

## 2024-06-14 ENCOUNTER — APPOINTMENT (OUTPATIENT)
Dept: PRIMARY CARE | Facility: CLINIC | Age: 79
End: 2024-06-14
Payer: COMMERCIAL

## 2024-07-09 ENCOUNTER — APPOINTMENT (OUTPATIENT)
Dept: DERMATOLOGY | Facility: CLINIC | Age: 79
End: 2024-07-09
Payer: COMMERCIAL

## 2024-07-09 DIAGNOSIS — L82.1 SEBORRHEIC KERATOSIS: ICD-10-CM

## 2024-07-09 DIAGNOSIS — D22.9 MULTIPLE BENIGN NEVI: ICD-10-CM

## 2024-07-09 DIAGNOSIS — L30.0 NUMMULAR DERMATITIS: ICD-10-CM

## 2024-07-09 DIAGNOSIS — L57.0 ACTINIC KERATOSIS: Primary | ICD-10-CM

## 2024-07-09 DIAGNOSIS — D18.01 HEMANGIOMA OF SKIN: ICD-10-CM

## 2024-07-09 DIAGNOSIS — Z85.828 PERSONAL HISTORY OF SKIN CANCER: ICD-10-CM

## 2024-07-09 DIAGNOSIS — L81.4 LENTIGO: ICD-10-CM

## 2024-07-09 DIAGNOSIS — L82.0 INFLAMED SEBORRHEIC KERATOSIS: ICD-10-CM

## 2024-07-09 PROCEDURE — 1159F MED LIST DOCD IN RCRD: CPT | Performed by: DERMATOLOGY

## 2024-07-09 PROCEDURE — 17000 DESTRUCT PREMALG LESION: CPT | Performed by: DERMATOLOGY

## 2024-07-09 PROCEDURE — 1036F TOBACCO NON-USER: CPT | Performed by: DERMATOLOGY

## 2024-07-09 PROCEDURE — 17111 DESTRUCTION B9 LESIONS 15/>: CPT | Performed by: DERMATOLOGY

## 2024-07-09 PROCEDURE — 99213 OFFICE O/P EST LOW 20 MIN: CPT | Performed by: DERMATOLOGY

## 2024-07-09 PROCEDURE — 1160F RVW MEDS BY RX/DR IN RCRD: CPT | Performed by: DERMATOLOGY

## 2024-07-09 PROCEDURE — 17003 DESTRUCT PREMALG LES 2-14: CPT | Performed by: DERMATOLOGY

## 2024-07-09 RX ORDER — TRIAMCINOLONE ACETONIDE 1 MG/G
OINTMENT TOPICAL
Qty: 80 G | Refills: 2 | Status: SHIPPED | OUTPATIENT
Start: 2024-07-09
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
## 2024-08-23 DIAGNOSIS — I10 BENIGN HYPERTENSION: ICD-10-CM

## 2024-08-23 RX ORDER — POTASSIUM CHLORIDE 750 MG/1
10 TABLET, EXTENDED RELEASE ORAL 2 TIMES DAILY
Qty: 180 TABLET | Refills: 3 | Status: SHIPPED | OUTPATIENT
Start: 2024-08-23

## 2024-08-23 NOTE — TELEPHONE ENCOUNTER
Patient asking for a refill of potassium chloride to go to [facility]. Can this be verified and sent in.

## 2024-09-24 ENCOUNTER — TELEPHONE (OUTPATIENT)
Dept: PRIMARY CARE | Facility: CLINIC | Age: 79
End: 2024-09-24

## 2024-09-24 ENCOUNTER — APPOINTMENT (OUTPATIENT)
Dept: PRIMARY CARE | Facility: CLINIC | Age: 79
End: 2024-09-24
Payer: COMMERCIAL

## 2024-09-24 VITALS
HEART RATE: 74 BPM | WEIGHT: 226.2 LBS | HEIGHT: 71 IN | SYSTOLIC BLOOD PRESSURE: 131 MMHG | DIASTOLIC BLOOD PRESSURE: 79 MMHG | RESPIRATION RATE: 16 BRPM | BODY MASS INDEX: 31.67 KG/M2

## 2024-09-24 DIAGNOSIS — N52.9 VASCULOGENIC ERECTILE DYSFUNCTION, UNSPECIFIED VASCULOGENIC ERECTILE DYSFUNCTION TYPE: ICD-10-CM

## 2024-09-24 DIAGNOSIS — Z13.89 ENCOUNTER FOR SCREENING FOR OTHER DISORDER: ICD-10-CM

## 2024-09-24 DIAGNOSIS — E78.00 PURE HYPERCHOLESTEROLEMIA: ICD-10-CM

## 2024-09-24 DIAGNOSIS — E55.9 VITAMIN D DEFICIENCY: Primary | ICD-10-CM

## 2024-09-24 DIAGNOSIS — R80.9 ALBUMINURIA: ICD-10-CM

## 2024-09-24 DIAGNOSIS — E78.2 MIXED HYPERLIPIDEMIA: ICD-10-CM

## 2024-09-24 DIAGNOSIS — I10 BENIGN ESSENTIAL HTN: ICD-10-CM

## 2024-09-24 DIAGNOSIS — Z00.00 ENCOUNTER FOR PREVENTIVE HEALTH EXAMINATION: ICD-10-CM

## 2024-09-24 DIAGNOSIS — Z71.89 CARDIAC RISK COUNSELING: ICD-10-CM

## 2024-09-24 PROCEDURE — G0446 INTENS BEHAVE THER CARDIO DX: HCPCS | Performed by: INTERNAL MEDICINE

## 2024-09-24 PROCEDURE — 1160F RVW MEDS BY RX/DR IN RCRD: CPT | Performed by: INTERNAL MEDICINE

## 2024-09-24 PROCEDURE — 3078F DIAST BP <80 MM HG: CPT | Performed by: INTERNAL MEDICINE

## 2024-09-24 PROCEDURE — 1159F MED LIST DOCD IN RCRD: CPT | Performed by: INTERNAL MEDICINE

## 2024-09-24 PROCEDURE — 1123F ACP DISCUSS/DSCN MKR DOCD: CPT | Performed by: INTERNAL MEDICINE

## 2024-09-24 PROCEDURE — G0442 ANNUAL ALCOHOL SCREEN 15 MIN: HCPCS | Performed by: INTERNAL MEDICINE

## 2024-09-24 PROCEDURE — 1036F TOBACCO NON-USER: CPT | Performed by: INTERNAL MEDICINE

## 2024-09-24 PROCEDURE — 99213 OFFICE O/P EST LOW 20 MIN: CPT | Performed by: INTERNAL MEDICINE

## 2024-09-24 PROCEDURE — 1126F AMNT PAIN NOTED NONE PRSNT: CPT | Performed by: INTERNAL MEDICINE

## 2024-09-24 PROCEDURE — 3075F SYST BP GE 130 - 139MM HG: CPT | Performed by: INTERNAL MEDICINE

## 2024-09-24 PROCEDURE — 1170F FXNL STATUS ASSESSED: CPT | Performed by: INTERNAL MEDICINE

## 2024-09-24 PROCEDURE — G0439 PPPS, SUBSEQ VISIT: HCPCS | Performed by: INTERNAL MEDICINE

## 2024-09-24 PROCEDURE — 1158F ADVNC CARE PLAN TLK DOCD: CPT | Performed by: INTERNAL MEDICINE

## 2024-09-24 RX ORDER — SILDENAFIL 50 MG/1
TABLET, FILM COATED ORAL
Qty: 30 TABLET | Refills: 3 | Status: SHIPPED | OUTPATIENT
Start: 2024-09-24

## 2024-09-24 RX ORDER — SILDENAFIL 50 MG/1
50 TABLET, FILM COATED ORAL AS NEEDED
Qty: 30 TABLET | Refills: 3 | Status: SHIPPED | OUTPATIENT
Start: 2024-09-24 | End: 2024-09-24 | Stop reason: SDUPTHER

## 2024-09-24 ASSESSMENT — ENCOUNTER SYMPTOMS
DIARRHEA: 0
SHORTNESS OF BREATH: 0
CONSTIPATION: 0
DIFFICULTY URINATING: 0

## 2024-09-24 ASSESSMENT — ACTIVITIES OF DAILY LIVING (ADL)
DOING_HOUSEWORK: INDEPENDENT
DRESSING: INDEPENDENT
BATHING: INDEPENDENT
MANAGING_FINANCES: INDEPENDENT
TAKING_MEDICATION: INDEPENDENT
GROCERY_SHOPPING: INDEPENDENT

## 2024-09-24 ASSESSMENT — PAIN SCALES - GENERAL: PAINLEVEL: 0-NO PAIN

## 2024-09-24 ASSESSMENT — PATIENT HEALTH QUESTIONNAIRE - PHQ9
2. FEELING DOWN, DEPRESSED OR HOPELESS: NOT AT ALL
SUM OF ALL RESPONSES TO PHQ9 QUESTIONS 1 AND 2: 0
1. LITTLE INTEREST OR PLEASURE IN DOING THINGS: NOT AT ALL
1. LITTLE INTEREST OR PLEASURE IN DOING THINGS: NOT AT ALL
2. FEELING DOWN, DEPRESSED OR HOPELESS: NOT AT ALL
SUM OF ALL RESPONSES TO PHQ9 QUESTIONS 1 AND 2: 0
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
## 2024-09-24 NOTE — PATIENT INSTRUCTIONS
You are doing well.     I sent in your refill for viagra.     Get blood test done after fasting, before the end of the year.    See me again in 6 months for 30 min follow up.

## 2024-09-24 NOTE — TELEPHONE ENCOUNTER
Idalia left message on phone- script for sildenafil cannot have on it as needed for dose. They need to know once a day,etc. Can they  be called at [phone]

## 2024-10-16 ENCOUNTER — APPOINTMENT (OUTPATIENT)
Dept: UROLOGY | Facility: HOSPITAL | Age: 79
End: 2024-10-16
Payer: COMMERCIAL

## 2024-10-22 ENCOUNTER — APPOINTMENT (OUTPATIENT)
Dept: UROLOGY | Facility: HOSPITAL | Age: 79
End: 2024-10-22
Payer: COMMERCIAL

## 2024-10-22 DIAGNOSIS — N13.8 BPH WITH OBSTRUCTION/LOWER URINARY TRACT SYMPTOMS: ICD-10-CM

## 2024-10-22 DIAGNOSIS — N40.1 BPH WITH OBSTRUCTION/LOWER URINARY TRACT SYMPTOMS: ICD-10-CM

## 2024-10-22 DIAGNOSIS — Z12.5 SCREENING PSA (PROSTATE SPECIFIC ANTIGEN): Primary | ICD-10-CM

## 2024-10-22 PROCEDURE — G2211 COMPLEX E/M VISIT ADD ON: HCPCS | Performed by: STUDENT IN AN ORGANIZED HEALTH CARE EDUCATION/TRAINING PROGRAM

## 2024-10-22 PROCEDURE — 99214 OFFICE O/P EST MOD 30 MIN: CPT | Performed by: STUDENT IN AN ORGANIZED HEALTH CARE EDUCATION/TRAINING PROGRAM

## 2024-10-22 RX ORDER — TAMSULOSIN HYDROCHLORIDE 0.4 MG/1
0.4 CAPSULE ORAL DAILY
Qty: 30 CAPSULE | Refills: 11 | Status: SHIPPED | OUTPATIENT
Start: 2024-10-22 | End: 2025-10-22

## 2024-10-22 NOTE — PROGRESS NOTES
Subjective   Patient ID: Connor Song is a 79 y.o. male    HPI  79 y.o. male who presents for a follow-up visit approximately six months after the last consultation. The patient reports mild urinary symptoms due to an enlarged prostate. He is currently taking Flomax, which he reports helps with his urinary symptoms, although he continues to wake up to urinate once or twice every night. The patient is advised to decrease caffeine intake and to limit fluid intake after 4 PM to address nocturia. The last PSA test was two years ago and a new PSA screening is planned.    The most recent PSA, conducted on 4/4/2022, revealed:  1.79 ng/ml           Review of Systems    All systems were reviewed. Anything negative was noted in the HPI.    Objective   Physical Exam    General: Well developed, well nourished, alert and cooperative, appears in no acute distress   Eyes: Non-injected conjunctiva, sclera clear, no proptosis   Cardiac: Extremities are warm and well perfused. No edema, cyanosis or pallor   Lungs: Breathing is easy, non-labored. Speaking in clear and complete sentences. Normal diaphragmatic movement   MSK: Ambulatory with steady gait, unassisted   Neuro: Alert and oriented to person, place, and time   Psych: Demonstrates good judgment and reason, without hallucinations, abnormal affect or abnormal behaviors   Skin: No obvious lesions, no rashes       No CVA tenderness bilaterally   No suprapubic pain or discomfort       No past medical history on file.      Past Surgical History:   Procedure Laterality Date    OTHER SURGICAL HISTORY  01/20/2022    Gallbladder surgery    OTHER SURGICAL HISTORY  01/20/2022    Hand surgery    OTHER SURGICAL HISTORY  07/25/2022    Back surgery    OTHER SURGICAL HISTORY  04/01/2022    Prostatectomy laser       Assessment/Plan   Benign Prostatic Hyperplasia (BPH) well controlled on Flomax    79 y.o. male who presents for the above condition, Today, we had a very long and extensive  discussion with the patient regarding the pathophysiology, differential diagnosis, risk factor, associated condition, diagnostic work-up and management of BPH and lower urinary tract symptoms and acute urinary retention. I discussed with the patient the need to check his PSA to assess his prostate cancer risk. We discussed at length the mechanism of action, risk, benefit, adverse events and side effect of alpha-blocker in the form of tamsulosin 0.4 mg p.o nightly. We discussed in particular the risk of hypotension, lightheadedness, dizziness, and the risk of fall and bone fracture. Also discussed retrograde ejaculation of the side effects of the medication. We had another discussion with the patient regarding lifestyle modifications including low fluid intake after 5 PM, timed voiding every 2 hours, and decrease caffeine intake. I discussed with the patient that in case he had an elevated PSA, we will proceed do an MRI of the prostate.      - Discussed lifestyle modifications for nocturia  - Need for PSA screening acknowledged    Plan:  - Start tamsulosin 0.4 mg PO daily (stop in case of side effects)  - PSA test today  - Follow up in 1 year    E&M visit today is associated with current or anticipated ongoing medical care services related to a patient's single, serious condition or a complex condition.     10/22/2024    Scribe Attestation  By signing my name below, I, Rui Peguero   attest that this documentation has been prepared under the direction and in the presence of Dr. Mark Osorio

## 2025-03-13 ENCOUNTER — TELEPHONE (OUTPATIENT)
Dept: PRIMARY CARE | Facility: CLINIC | Age: 80
End: 2025-03-13
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
## 2025-03-25 ENCOUNTER — APPOINTMENT (OUTPATIENT)
Dept: PRIMARY CARE | Facility: CLINIC | Age: 80
End: 2025-03-25
Payer: COMMERCIAL

## 2025-03-25 ENCOUNTER — HOSPITAL ENCOUNTER (OUTPATIENT)
Dept: RADIOLOGY | Facility: CLINIC | Age: 80
Discharge: HOME | End: 2025-03-25
Payer: COMMERCIAL

## 2025-03-25 VITALS
DIASTOLIC BLOOD PRESSURE: 86 MMHG | RESPIRATION RATE: 16 BRPM | BODY MASS INDEX: 31.44 KG/M2 | HEART RATE: 74 BPM | SYSTOLIC BLOOD PRESSURE: 135 MMHG | WEIGHT: 224.6 LBS | HEIGHT: 71 IN

## 2025-03-25 DIAGNOSIS — I10 BENIGN ESSENTIAL HTN: ICD-10-CM

## 2025-03-25 DIAGNOSIS — R06.00 DYSPNEA, UNSPECIFIED TYPE: ICD-10-CM

## 2025-03-25 DIAGNOSIS — N13.8 BPH WITH OBSTRUCTION/LOWER URINARY TRACT SYMPTOMS: ICD-10-CM

## 2025-03-25 DIAGNOSIS — E78.2 MIXED HYPERLIPIDEMIA: ICD-10-CM

## 2025-03-25 DIAGNOSIS — S29.9XXD TRAUMA OF CHEST, SUBSEQUENT ENCOUNTER: ICD-10-CM

## 2025-03-25 DIAGNOSIS — S20.211A CONTUSION OF RIGHT CHEST WALL, INITIAL ENCOUNTER: ICD-10-CM

## 2025-03-25 DIAGNOSIS — N52.9 VASCULOGENIC ERECTILE DYSFUNCTION, UNSPECIFIED VASCULOGENIC ERECTILE DYSFUNCTION TYPE: ICD-10-CM
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
## 2025-03-25 DIAGNOSIS — I10 BENIGN HYPERTENSION: ICD-10-CM

## 2025-03-25 DIAGNOSIS — N40.1 BPH WITH OBSTRUCTION/LOWER URINARY TRACT SYMPTOMS: ICD-10-CM

## 2025-03-25 PROCEDURE — 3075F SYST BP GE 130 - 139MM HG: CPT | Performed by: INTERNAL MEDICINE

## 2025-03-25 PROCEDURE — 1159F MED LIST DOCD IN RCRD: CPT | Performed by: INTERNAL MEDICINE

## 2025-03-25 PROCEDURE — 1036F TOBACCO NON-USER: CPT | Performed by: INTERNAL MEDICINE

## 2025-03-25 PROCEDURE — 3079F DIAST BP 80-89 MM HG: CPT | Performed by: INTERNAL MEDICINE

## 2025-03-25 PROCEDURE — 71046 X-RAY EXAM CHEST 2 VIEWS: CPT

## 2025-03-25 PROCEDURE — 99214 OFFICE O/P EST MOD 30 MIN: CPT | Performed by: INTERNAL MEDICINE

## 2025-03-25 PROCEDURE — 1125F AMNT PAIN NOTED PAIN PRSNT: CPT | Performed by: INTERNAL MEDICINE

## 2025-03-25 RX ORDER — ATORVASTATIN CALCIUM 40 MG/1
40 TABLET, FILM COATED ORAL DAILY
Qty: 90 TABLET | Refills: 3 | Status: SHIPPED | OUTPATIENT
Start: 2025-03-25

## 2025-03-25 RX ORDER — AMLODIPINE BESYLATE 10 MG/1
10 TABLET ORAL DAILY
Qty: 90 TABLET | Refills: 3 | Status: SHIPPED | OUTPATIENT
Start: 2025-03-25

## 2025-03-25 RX ORDER — HYDROCHLOROTHIAZIDE 25 MG/1
25 TABLET ORAL DAILY
Qty: 90 TABLET | Refills: 3 | Status: SHIPPED | OUTPATIENT
Start: 2025-03-25

## 2025-03-25 RX ORDER — SILDENAFIL 50 MG/1
TABLET, FILM COATED ORAL
Qty: 30 TABLET | Refills: 3 | Status: SHIPPED | OUTPATIENT
Start: 2025-03-25

## 2025-03-25 RX ORDER — POTASSIUM CHLORIDE 750 MG/1
10 TABLET, EXTENDED RELEASE ORAL 2 TIMES DAILY
Qty: 180 TABLET | Refills: 3 | Status: SHIPPED | OUTPATIENT
Start: 2025-03-25

## 2025-03-25 RX ORDER — LOSARTAN POTASSIUM 100 MG/1
100 TABLET ORAL DAILY
Qty: 90 TABLET | Refills: 3 | Status: SHIPPED | OUTPATIENT
Start: 2025-03-25

## 2025-03-25 ASSESSMENT — ENCOUNTER SYMPTOMS
DIZZINESS: 1
SLEEP DISTURBANCE: 1

## 2025-03-25 ASSESSMENT — PAIN SCALES - GENERAL: PAINLEVEL_OUTOF10: 4

## 2025-03-25 NOTE — TELEPHONE ENCOUNTER
Patient was not sure if he requested refills, so I am putting in task for him.  1 amlodipine besylate 10 mg.  2 atorvastatin  calcium  40 mg.  3 hydrochlorothyazide  25 mg.  4 losartan potassium 100 mg.  5  potassium chloride 10 mEq  6 sildenafil  citrate 50 mg.    [address]  Also wanted to know if his refills could be set up for home delivery.

## 2025-03-25 NOTE — PATIENT INSTRUCTIONS
I think you may have some costochondritis ( inflammation of cartilage in ribs and sternum) .   Also probably some bruised ribs.     I recommend using ice / cold pack to the right of your breastbone a few times a day for about 15 min at a time.     Get chest xray. Order is in the system.     Schedule your Medicare Wellness for August or September.   Get blood test done after fasting close to that visit.

## 2025-03-28 ENCOUNTER — TELEPHONE (OUTPATIENT)
Dept: PRIMARY CARE | Facility: CLINIC | Age: 80
End: 2025-03-28
Payer: COMMERCIAL

## 2025-03-28 NOTE — TELEPHONE ENCOUNTER
----- Message from Sylvia Stratton sent at 3/27/2025  4:41 PM EDT -----  Notify him that his chest xray did not show any rib or sternal fractures, and lungs are normal.

## 2025-05-15 ENCOUNTER — TELEPHONE (OUTPATIENT)
Dept: DERMATOLOGY | Facility: CLINIC | Age: 80
End: 2025-05-15
Payer: COMMERCIAL

## 2025-05-15 NOTE — TELEPHONE ENCOUNTER
Pt stopped in office with concerns about a bump on his finger. Pt states he has been using neosporin to the bump. Advised pt to discontinue use of neosporin and to keep the area clean and covered while wearing work gloves. Pt states he is scheduled with Dr. Barragan in a couple months but wanted to know if there were any sooner appointments. Pt states he would prefer to see someone in the [place] office. Message sent to scheduling to check for any sooner appointments with Dr. Barragan or Sue Mayne CNP for lesion check.     Asia Montiel RN

## 2025-05-20 ENCOUNTER — APPOINTMENT (OUTPATIENT)
Dept: DERMATOLOGY | Facility: CLINIC | Age: 80
End: 2025-05-20
Payer: COMMERCIAL

## 2025-05-20 DIAGNOSIS — D48.5 NEOPLASM OF UNCERTAIN BEHAVIOR OF SKIN: Primary | ICD-10-CM

## 2025-05-20 DIAGNOSIS — Z85.828 PERSONAL HISTORY OF SKIN CANCER: ICD-10-CM

## 2025-05-20 ASSESSMENT — DERMATOLOGY QUALITY OF LIFE (QOL) ASSESSMENT
RATE HOW EMOTIONALLY BOTHERED YOU ARE BY YOUR SKIN PROBLEM (FOR EXAMPLE, WORRY, EMBARRASSMENT, FRUSTRATION): 1
RATE HOW BOTHERED YOU ARE BY EFFECTS OF YOUR SKIN PROBLEMS ON YOUR ACTIVITIES (EG, GOING OUT, ACCOMPLISHING WHAT YOU WANT, WORK ACTIVITIES OR YOUR RELATIONSHIPS WITH OTHERS): 2
WHAT SINGLE SKIN CONDITION LISTED BELOW IS THE PATIENT ANSWERING THE QUALITY-OF-LIFE ASSESSMENT QUESTIONS ABOUT: NONE OF THE ABOVE
RATE HOW BOTHERED YOU ARE BY SYMPTOMS OF YOUR SKIN PROBLEM (EG, ITCHING, STINGING BURNING, HURTING OR SKIN IRRITATION): 6 - ALWAYS BOTHERED
RATE HOW BOTHERED YOU ARE BY SYMPTOMS OF YOUR SKIN PROBLEM (EG, ITCHING, STINGING BURNING, HURTING OR SKIN IRRITATION): 6 - ALWAYS BOTHERED
RATE HOW EMOTIONALLY BOTHERED YOU ARE BY YOUR SKIN PROBLEM (FOR EXAMPLE, WORRY, EMBARRASSMENT, FRUSTRATION): 1
DATE THE QUALITY-OF-LIFE ASSESSMENT WAS COMPLETED: 67345
WHAT SINGLE SKIN CONDITION LISTED BELOW IS THE PATIENT ANSWERING THE QUALITY-OF-LIFE ASSESSMENT QUESTIONS ABOUT: NONE OF THE ABOVE
RATE HOW BOTHERED YOU ARE BY EFFECTS OF YOUR SKIN PROBLEMS ON YOUR ACTIVITIES (EG, GOING OUT, ACCOMPLISHING WHAT YOU WANT, WORK ACTIVITIES OR YOUR RELATIONSHIPS WITH OTHERS): 2

## 2025-05-20 NOTE — PROGRESS NOTES
Subjective     Connor Song is a 79 y.o. male who presents for the following: Suspicious Skin Lesion (LV: 7/9/24: FBSE, scheduled for annual exam in July. Pt arrives today for a lesion on right pointer finger present for several months.  Remote hx of NMSC.  Pt notes lesion has grown over the past few weeks.  Pt admits to picking at area.  Previously used antibiotic ointment and vaseline to site.).     Review of Systems:  No other skin or systemic complaints other than what is documented elsewhere in the note.    The following portions of the chart were reviewed this encounter and updated as appropriate:   Tobacco  Allergies  Meds  Problems  Med Hx  Surg Hx  Fam Hx         Skin Cancer History  Biopsy Log Book  No skin cancers from Specimen Tracking.    Additional History  NMSC L wrist 2002   NMSC L pretibial leg 1997         Objective   Well appearing patient in no apparent distress; mood and affect are within normal limits.    A focused skin examination was performed. All findings within normal limits unless otherwise noted below.    Assessment/Plan   Skin Exam  1. NEOPLASM OF UNCERTAIN BEHAVIOR OF SKIN  Right Proximal 2nd Finger  1.1cm hyperkeratotic crateriform plaque    Shave removal    Lesion diameter (cm):  1.1  Informed consent: discussed and consent obtained    Timeout: patient name, date of birth, surgical site, and procedure verified    Procedure prep:  Patient was prepped and draped  Anesthesia: the lesion was anesthetized in a standard fashion    Anesthetic:  1% lidocaine w/ epinephrine 1-100,000 local infiltration  Instrument used: DermaBlade    Hemostasis achieved with: aluminum chloride    Outcome: patient tolerated procedure well    Post-procedure details: sterile dressing applied and wound care instructions given    Dressing type: bandage and petrolatum      Staff Communication: Dermatology Local Anesthesia: Site Location: R proximal 2nd finger 1 % Lidocaine / Epinephrine - Amount:  0.2cc  Specimen 1 - Dermatopathology- DERM LAB  Differential Diagnosis: SCC-KA  Check Margins Yes/No?:    Comments:    Dermpath Lab: Routine Histopathology (formalin-fixed tissue)  2. PERSONAL HISTORY OF SKIN CANCER  Generalized  Personal History of Non-Melanoma Skin Cancer, remote, per patient: NMSC L wrist 2002 and NMSC L pretibial leg 1997  -Well healed scar(s) with no evidence of recurrence  -Discussed the need for annual or semi-annual skin examinations and to return sooner if any new or changing lesions are noticed. Patient verbalizes understanding    Follow up in July for FSE as scheduled  Discussed if there are any changes or development of concerning symptoms (lesion/skin condition is changing, bleeding, enlarging, or worsening) the patient is to contact my office. The patient verbalizes understanding.    Cherelle Chow MD  5/20/2025

## 2025-05-20 NOTE — Clinical Note
Personal History of Non-Melanoma Skin Cancer, remote, per patient: NMSC L wrist 2002 and NMSC L pretibial leg 1997  -Well healed scar(s) with no evidence of recurrence  -Discussed the need for annual or semi-annual skin examinations and to return sooner if any new or changing lesions are noticed. Patient verbalizes understanding

## 2025-05-22 DIAGNOSIS — C44.92 SCC (SQUAMOUS CELL CARCINOMA): Primary | ICD-10-CM

## 2025-05-22 LAB
LABORATORY COMMENT REPORT: NORMAL
PATH REPORT.FINAL DX SPEC: NORMAL
PATH REPORT.GROSS SPEC: NORMAL
PATH REPORT.MICROSCOPIC SPEC OTHER STN: NORMAL
PATH REPORT.RELEVANT HX SPEC: NORMAL
PATH REPORT.TOTAL CANCER: NORMAL

## 2025-05-23 NOTE — RESULT ENCOUNTER NOTE
Pt was contacted and notified of malignant results at this time.  Pt is agreeable to treatment plan. No further questions noted.      Asia Montiel RN

## 2025-06-12 ENCOUNTER — APPOINTMENT (OUTPATIENT)
Dept: DERMATOLOGY | Facility: CLINIC | Age: 80
End: 2025-06-12
Payer: COMMERCIAL

## 2025-06-12 VITALS — SYSTOLIC BLOOD PRESSURE: 154 MMHG | HEART RATE: 69 BPM | DIASTOLIC BLOOD PRESSURE: 90 MMHG

## 2025-06-12 DIAGNOSIS — C44.622 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, RIGHT: ICD-10-CM

## 2025-06-12 PROCEDURE — 17312 MOHS ADDL STAGE: CPT | Performed by: STUDENT IN AN ORGANIZED HEALTH CARE EDUCATION/TRAINING PROGRAM

## 2025-06-12 PROCEDURE — 99214 OFFICE O/P EST MOD 30 MIN: CPT | Performed by: STUDENT IN AN ORGANIZED HEALTH CARE EDUCATION/TRAINING PROGRAM

## 2025-06-12 PROCEDURE — 17311 MOHS 1 STAGE H/N/HF/G: CPT | Performed by: STUDENT IN AN ORGANIZED HEALTH CARE EDUCATION/TRAINING PROGRAM

## 2025-06-12 RX ORDER — MUPIROCIN 20 MG/G
OINTMENT TOPICAL DAILY
Qty: 44 G | Refills: 1 | Status: SHIPPED | OUTPATIENT
Start: 2025-06-12 | End: 2025-06-26

## 2025-06-12 NOTE — PROGRESS NOTES
Mohs Surgery Operative Note    Date of Surgery:  6/12/2025  Surgeon:  William Thomas MD  Office Location: [facility] [place] [id number] [address] [address]  Dept: [phone]  Dept Fax: [phone]  Referring Provider: Cherelle Chow MD  [address] [place] [id number]      Assessment/Plan   Pre-procedure:   Obtained informed consent: written from patient  The surgical site was identified and confirmed with the patient.     Intra-operative:   Audible time out called at : 9:21 AM 06/12/25  by: Park Holman LPN   Verified patient name, birthdate, site, specimen bottle label & requisition.    The planned procedure(s) was again reviewed with the patient. The risks of bleeding, infection, nerve damage and scarring were reviewed. Written authorization was obtained. The patient identity, surgical site, and planned procedure(s) were verified. The provider acted as both surgeon and pathologist.     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, RIGHT  Right Proximal 2nd Finger  Mohs surgery    Consent obtained: written    Universal Protocol:  Procedure explained and questions answered to patient or proxy's satisfaction: Yes    Test results available and properly labeled: Yes    Pathology report reviewed: Yes    External notes reviewed: Yes    Photo or diagram used for site identification: Yes    Site/side marked: Yes    Slide independently reviewed by Mohs surgeon: Yes    Immediately prior to procedure a time out was called: Yes    Patient identity confirmed: verbally with patient  Preparation: Patient was prepped and draped in usual sterile fashion      Anticoagulation:  Is the patient taking prescription anticoagulant and/or aspirin prescribed/recommended by a physician? Yes    Was the anticoagulation regimen changed prior to Mohs? No      Anesthesia:  Anesthesia method: local infiltration  Local anesthetic: lidocaine 1% WITH epi    Procedure  Details:  Case ID Number: [id number]  Biopsy accession number: [id number]  Date of biopsy: 5/20/2025  Pre-Op diagnosis: squamous cell carcinoma  Surgical site (from skin exam): Right Proximal 2nd Finger  Pre-operative length (cm): 1.8  Pre-operative width (cm): 1.8  Indications for Mohs surgery: anatomic location where tissue conservation is critical  Previously treated? No      Micrographic Surgery Details:  Post-operative length (cm): 2.1  Post-operative width (cm): 2.5  Number of Mohs stages: 4    Stage 1     Comments: The patient was brought into the operating room and placed in the procedure chair in the appropriate position.  The area positive by previous biopsy was identified and confirmed with the patient. The area of clinically obvious tumor was debulked using a curette and/or scalpel as needed. An incision was made following the Mohs approach through the skin. The specimen was taken to the lab, divided into 2 piece(s) and appropriately chromacoded and processed.    Tumor was seen on the lateral margins as indicated on the on the Mohs map.  Squamous cell carcinoma in situ. Histologic examination revealed enlarged, atypical keratinocytes with large nuclear to cytoplasmic ratio extending near full thickness within the epidermis.                Tumor features identified on Mohs section: Squamous Cell Carcinoma in Situ      Depth of invasion: Epidermis    Stage 2     Comments: The area of positivity as noted on the Mohs map in the previous stage was identified and removed using the Mohs technique. The specimen was taken to the lab and appropriately chromacoded and processed in 2 piece(s).    Tumor was seen on the lateral margins as indicated on the on the Mohs map.  Squamous cell carcinoma in situ. Histologic examination revealed enlarged, atypical keratinocytes with large nuclear to cytoplasmic ratio extending near full thickness within the epidermis.            Tumor features identified on Mohs section: squamous  cell carcinoma     Depth of invasion: Epidermis    Stage 3     Comments: The area of positivity as noted on the Mohs map in the previous stage was identified and removed using the Mohs technique. The specimen was taken to the lab and appropriately chromacoded and processed in 1 piece(s).    Tumor was seen on the lateral margins as indicated on the on the Mohs map.  Squamous cell carcinoma in situ. Histologic examination revealed enlarged, atypical keratinocytes with large nuclear to cytoplasmic ratio extending near full thickness within the epidermis.            Tumor features identified on Mohs section: Squamous Cell Carcinoma in Situ     Depth of invasion: Epidermis    Stage 4     Comments: The area of positivity as noted on the Mohs map in the previous stage was identified and removed using the Mohs technique. The specimen was taken to the lab and appropriately chromacoded and processed in 1 piece(s).       Tumor features identified on Mohs section: no tumor identified    Depth of defect: subcutaneous fat    Patient tolerance of procedure: tolerated well, no immediate complications    Reconstruction:  Was the defect reconstructed?: No     Repair: After a discussion with the patient regarding the options for wound closure, a decision was made to proceed with second intention healing.    Dressing/Follow-up: Surgifoam was placed in the wound. A pressure dressing was placed to help stabilize the wound and to minimize the risk of postoperative bleeding. Wound care was discussed, and the patient was given written post-operative wound care instructions.          Staff Communication: Dermatology Local Anesthesia: 1 % Lidocaine / Epinephrine - Amount: 6 ml Right proximal second finger  Related Medications  mupirocin (Bactroban) 2 % ointment  Apply topically once daily for 14 days. Apply to wound on right 2nd finger          Mupirocin ointment was prescribed for wound care.    The patient will follow up with William Thomas  MD as needed for any post operative problems or concerns, and will follow up with their primary dermatologist as scheduled.       I, Ramos Holman LPN am scribing for, and in the presence of William Thomas MD    I, William Thomas MD, personally performed the services described in the documentation as scribed by Ramos Holman LPN  in my presence, and confirm it is both accurate and complete.

## 2025-06-12 NOTE — PROGRESS NOTES
Office Visit Note  Date: 6/12/2025  Surgeon:  William Thomas MD  Office Location: [facility] [address] [address]  Dept: [phone]  Dept Fax: [phone]  Referring Provider: Cherelle Chow MD  [address] [place] [address]    Subjective   Connor Song is a 79 y.o. male who presents for the following: MOHS Surgery (Right Proximal 2nd Finger)    According to the patient, the lesion has been present for approximately 6 months at the time of diagnosis.  The lesion is itchy.  The lesion has not been treated previously.    The patient does not have a pacemaker / defibrillator.  The patient does not have a heart valve / joint replacement.    The patient is on blood thinners.  The patient does not have a history of hepatitis B or C.  The patient does not have a history of HIV.  The patient does not have a history of immunosuppression (e.g. organ transplantation, malignancy, medications)    Review of Systems:  No other skin or systemic complaints other than what is documented elsewhere in the note.    MEDICAL HISTORY: clinically relevant history including significant past medical history, medications and allergies was reviewed and documented in Epic.    Objective   Well appearing patient in no apparent distress; mood and affect are within normal limits.  Vital signs: See record.  Noted on the   Right Proximal 2nd Finger  Is a 1.8 x 1.8 cm plaque    The patient confirmed the identified site.    Discussion:  The nature of the diagnosis was explained. The lesion is a skin cancer.  It has a risk of local growth and distant spread. The condition is associated with sun exposure.  Warning signs of non-melanoma skin cancer discussed. Patient was instructed to perform monthly self skin examination.  We recommended that the patient have regular full skin exams given an increased risk of subsequent skin cancers. The patient was instructed to use  sun protective behaviors including use of broad spectrum sunscreens and sun protective clothing to reduce risk of skin cancers.      Risks, benefits, side effects of Mohs surgery were discussed with patient and the patient voiced understanding.  It was explained that even though the cure rate of Mohs is very high it is not 100%. Risks of surgery including but not limited to bleeding, infection, numbness, nerve damage, and scar were reviewed.  Discussion included wound care requirements, activity restrictions, likely scar outcome and time to heal.     After Mohs surgery, the defect may need to be repaired surgically and the scar may be longer than the original lesion.  Reconstruction options, risks, and benefits were reviewed including second intention healing, linear repair (4-1 ratio was explained), local flaps, skin grafts, cartilage grafts and interpolation flaps (the need for multiple surgeries was explained). Possible outcomes were reviewed including likely scar appearance, failure of flap survival, infection, bleeding and the need for revision surgery.     The patient has a squamous cell carcinoma. It was reviewed with the patient that the skin cancer will continue to progress without treatment. The pathology was reviewed, the photograph was reviewed, and the referring physician's note was reviewed.    Medical Decision Making - moderate  Column 1 - chronic illness with progression  Column 3 - decision regarding minor surgery with identified risk factors (bleeding, infection, scarring). Moderate risk of morbidity from additional treatment - mohs surgery    Patient elected for Mohs surgery.

## 2025-06-24 ASSESSMENT — DERMATOLOGY QUALITY OF LIFE (QOL) ASSESSMENT
RATE HOW BOTHERED YOU ARE BY SYMPTOMS OF YOUR SKIN PROBLEM (EG, ITCHING, STINGING BURNING, HURTING OR SKIN IRRITATION): 3
RATE HOW EMOTIONALLY BOTHERED YOU ARE BY YOUR SKIN PROBLEM (FOR EXAMPLE, WORRY, EMBARRASSMENT, FRUSTRATION): 0 - NEVER BOTHERED
RATE HOW BOTHERED YOU ARE BY EFFECTS OF YOUR SKIN PROBLEMS ON YOUR ACTIVITIES (EG, GOING OUT, ACCOMPLISHING WHAT YOU WANT, WORK ACTIVITIES OR YOUR RELATIONSHIPS WITH OTHERS): 3
RATE HOW BOTHERED YOU ARE BY EFFECTS OF YOUR SKIN PROBLEMS ON YOUR ACTIVITIES (EG, GOING OUT, ACCOMPLISHING WHAT YOU WANT, WORK ACTIVITIES OR YOUR RELATIONSHIPS WITH OTHERS): 3
RATE HOW BOTHERED YOU ARE BY SYMPTOMS OF YOUR SKIN PROBLEM (EG, ITCHING, STINGING BURNING, HURTING OR SKIN IRRITATION): 3
DATE THE QUALITY-OF-LIFE ASSESSMENT WAS COMPLETED: 67380
WHAT SINGLE SKIN CONDITION LISTED BELOW IS THE PATIENT ANSWERING THE QUALITY-OF-LIFE ASSESSMENT QUESTIONS ABOUT: NONE OF THE ABOVE
WHAT SINGLE SKIN CONDITION LISTED BELOW IS THE PATIENT ANSWERING THE QUALITY-OF-LIFE ASSESSMENT QUESTIONS ABOUT: NONE OF THE ABOVE
RATE HOW EMOTIONALLY BOTHERED YOU ARE BY YOUR SKIN PROBLEM (FOR EXAMPLE, WORRY, EMBARRASSMENT, FRUSTRATION): 0 - NEVER BOTHERED

## 2025-06-24 ASSESSMENT — PATIENT GLOBAL ASSESSMENT (PGA): WHAT IS THE PGA: PATIENT GLOBAL ASSESSMENT:  2 - MILD

## 2025-06-26 ENCOUNTER — APPOINTMENT (OUTPATIENT)
Dept: DERMATOLOGY | Facility: CLINIC | Age: 80
End: 2025-06-26
Payer: COMMERCIAL

## 2025-06-26 DIAGNOSIS — S61.200A OPEN WOUND OF RIGHT INDEX FINGER: ICD-10-CM

## 2025-06-26 PROCEDURE — 99212 OFFICE O/P EST SF 10 MIN: CPT | Performed by: STUDENT IN AN ORGANIZED HEALTH CARE EDUCATION/TRAINING PROGRAM

## 2025-06-26 NOTE — PROGRESS NOTES
Office Follow Up Note    Visit Summary  Chief Complaint    1. Complaint Wound check.    Connor Song is a 79 y.o. male who presents for 2 week follow up after surgery for a squamous cell carcinoma. The patient has no concerns today.     Location Operation site location: right proximal 2nd finger    On exam,  Mr. Song is well-appearing and in no apparent distress. The surgical site appears clean with minimal to no erythema. No tenderness. There is appropriate granulation tissue centrally.     Assessment and Plan:  History of skin cancer requiring ongoing monitoring for recurrence and additional lesion development.   The patient was reassured that the wound is healing appropriately. He was advised to continue wound care for 3-4 weeks until wound is healed.   The dressing was removed, the wound cleaned and a new dressing reapplied.     The patient was advised on the importance of routine skin monitoring including follow up with general dermatology and instructed to call with any further concerns. The patient will return as needed.     Alexa SHARMA RN, am scribing for, and in the presence of MD ZACHARY Ramey Jake X Wang, MD, personally performed the services described in the documentation as scribed by Alexa Hitchcock RN in my presence, and confirm it is both accurate and complete.

## 2025-07-14 ASSESSMENT — DERMATOLOGY QUALITY OF LIFE (QOL) ASSESSMENT
DATE THE QUALITY-OF-LIFE ASSESSMENT WAS COMPLETED: 67400
RATE HOW BOTHERED YOU ARE BY EFFECTS OF YOUR SKIN PROBLEMS ON YOUR ACTIVITIES (EG, GOING OUT, ACCOMPLISHING WHAT YOU WANT, WORK ACTIVITIES OR YOUR RELATIONSHIPS WITH OTHERS): 0 - NEVER BOTHERED
RATE HOW EMOTIONALLY BOTHERED YOU ARE BY YOUR SKIN PROBLEM (FOR EXAMPLE, WORRY, EMBARRASSMENT, FRUSTRATION): 0 - NEVER BOTHERED
RATE HOW EMOTIONALLY BOTHERED YOU ARE BY YOUR SKIN PROBLEM (FOR EXAMPLE, WORRY, EMBARRASSMENT, FRUSTRATION): 0 - NEVER BOTHERED
RATE HOW BOTHERED YOU ARE BY EFFECTS OF YOUR SKIN PROBLEMS ON YOUR ACTIVITIES (EG, GOING OUT, ACCOMPLISHING WHAT YOU WANT, WORK ACTIVITIES OR YOUR RELATIONSHIPS WITH OTHERS): 0 - NEVER BOTHERED
RATE HOW BOTHERED YOU ARE BY SYMPTOMS OF YOUR SKIN PROBLEM (EG, ITCHING, STINGING BURNING, HURTING OR SKIN IRRITATION): 4
WHAT SINGLE SKIN CONDITION LISTED BELOW IS THE PATIENT ANSWERING THE QUALITY-OF-LIFE ASSESSMENT QUESTIONS ABOUT: NONE OF THE ABOVE
RATE HOW BOTHERED YOU ARE BY SYMPTOMS OF YOUR SKIN PROBLEM (EG, ITCHING, STINGING BURNING, HURTING OR SKIN IRRITATION): 4
WHAT SINGLE SKIN CONDITION LISTED BELOW IS THE PATIENT ANSWERING THE QUALITY-OF-LIFE ASSESSMENT QUESTIONS ABOUT: NONE OF THE ABOVE

## 2025-07-14 ASSESSMENT — PATIENT GLOBAL ASSESSMENT (PGA): WHAT IS THE PGA: PATIENT GLOBAL ASSESSMENT:  2 - MILD

## 2025-07-15 ENCOUNTER — APPOINTMENT (OUTPATIENT)
Dept: DERMATOLOGY | Facility: CLINIC | Age: 80
End: 2025-07-15
Payer: COMMERCIAL

## 2025-07-15 DIAGNOSIS — D22.9 MULTIPLE BENIGN NEVI: ICD-10-CM

## 2025-07-15 DIAGNOSIS — L81.4 LENTIGO: ICD-10-CM

## 2025-07-15 DIAGNOSIS — L57.0 ACTINIC KERATOSIS: ICD-10-CM

## 2025-07-15 DIAGNOSIS — D18.01 HEMANGIOMA OF SKIN: ICD-10-CM

## 2025-07-15 DIAGNOSIS — Z12.83 SCREENING EXAM FOR SKIN CANCER: ICD-10-CM

## 2025-07-15 DIAGNOSIS — L30.0 NUMMULAR DERMATITIS: Primary | ICD-10-CM

## 2025-07-15 DIAGNOSIS — Z85.828 PERSONAL HISTORY OF SKIN CANCER: ICD-10-CM

## 2025-07-15 DIAGNOSIS — L82.1 SEBORRHEIC KERATOSIS: ICD-10-CM

## 2025-07-15 PROCEDURE — 1159F MED LIST DOCD IN RCRD: CPT | Performed by: DERMATOLOGY

## 2025-07-15 PROCEDURE — 99214 OFFICE O/P EST MOD 30 MIN: CPT | Performed by: DERMATOLOGY

## 2025-07-15 PROCEDURE — 1160F RVW MEDS BY RX/DR IN RCRD: CPT | Performed by: DERMATOLOGY

## 2025-07-15 PROCEDURE — 1036F TOBACCO NON-USER: CPT | Performed by: DERMATOLOGY

## 2025-07-15 RX ORDER — CLOBETASOL PROPIONATE 0.5 MG/G
CREAM TOPICAL
Qty: 45 G | Refills: 1 | Status: SHIPPED | OUTPATIENT
Start: 2025-07-15

## 2025-07-15 NOTE — PROGRESS NOTES
Subjective     Connor Song is a 79 y.o. male who presents for the following: Skin Check (Personal history of NMSC- SCC R proximal 2nd finger 05.2025. Patient history of actinic keratosis. Pt declines chaperone at this time. ).     Review of Systems:  No other skin or systemic complaints other than what is documented elsewhere in the note.    The following portions of the chart were reviewed this encounter and updated as appropriate:  Tobacco  Allergies  Meds  Problems  Med Hx  Surg Hx  Fam Hx         Skin Cancer History  Biopsy Log Book  No skin cancers from Specimen Tracking.    Additional History  NMSC L wrist 2002   NMSC L pretibial leg 1997         Objective     Well appearing patient in no apparent distress; mood and affect are within normal limits.    A full examination was performed including scalp, face, neck, chest, abdomen, back, bilateral upper extremities, bilateral lower extremities, buttocks, genitalia.      All findings within normal limits unless otherwise noted below.    Assessment/Plan   Skin Exam  1. NUMMULAR DERMATITIS  Left Lower Leg - Anterior, Right Lower Leg - Anterior  Erythematous, coin shaped plaque(s)  -Discussed nature of diagnosis and treatment options  -When the rash is active, apply topical corticosteroids to the active areas of the rash as prescribed (Clobetasol)  -Recommend to use liberal emollients twice daily, one time applied immediately after shower while skin is still slightly damp. Use emollients to all areas of the body that may be affected and use whether the rash is active or not. Use prescription medications before applying emollients.  -Discussed/information given with/to the patient on the risks, benefits and alternatives of the usage of topical corticosteroids, including but not limited to: atrophy (thinning of the skin), striae (stretch marks), telangiectasia (blood vessel growth), and dyspigmentation (discoloration of the skin).  -Recommend to limit long-term  use of topical corticosteroids to less than 14 days per month to reduce risk of side effects.  -Recommend:  clobetasol (Temovate) 0.05 % cream - Left Lower Leg - Anterior, Right Lower Leg - Anterior  Apply to affected areas on the lower legs twice daily when active. Use less than 14 days per month.  Related Medications  triamcinolone (Kenalog) 0.1 % ointment  Apply to affected areas twice daily when active as needed. Use less than 14 days per month.  2. ACTINIC KERATOSIS (2)  Left Parotid Area, Right Zygomatic Area  Erythematous scaly macule(s)  -Discussed nature of diagnosis and treatment options.   -Patient wishes to proceed with Cryotherapy today  -Possible side effects of liquid nitrogen treatment reviewed including formation of blisters, crusting, tenderness, scar, and discoloration which may be permanent.  -Patient advised to return the office for re-evaluation if the treated lesion(s) do not resolve within 4-6 weeks. Patient verbalizes understanding.  Destr of lesion - Left Parotid Area, Right Zygomatic Area  Complexity: simple    Destruction method: cryotherapy    Informed consent: discussed and consent obtained    Lesion destroyed using liquid nitrogen: Yes    Outcome: patient tolerated procedure well with no complications    Post-procedure details: wound care instructions given    3. MULTIPLE BENIGN NEVI  Generalized  Brown and tan macules and papules with reassuring findings on dermoscopy  -These lesions have benign, reassuring patterns on dermoscopy  -Recommend continued self observation, and to contact the office if any changes in nevi are noticed  4. LENTIGO  Generalized  Tan macules  -Benign appearing on exam  -Reassurance, recommend observation  5. SEBORRHEIC KERATOSIS  Generalized  Stuck on, waxy macule(s)/papule(s)/plaque(s) with comedo-like openings and milia like cysts  -Discussed the nature of the diagnosis  -Reassurance, recommend continued observation  6. HEMANGIOMA OF  SKIN  Generalized  Cherry red papules  -Discussed the nature of the diagnosis  -Reassurance, recommend continued observation  7. PERSONAL HISTORY OF SKIN CANCER  Generalized  Personal History of Non-Melanoma Skin Cancer, most recent SCC R proximal second finger May 2025  -Well healed scar(s) with no evidence of recurrence  -Discussed the need for annual or semi-annual skin examinations and to return sooner if any new or changing lesions are noticed. Patient verbalizes understanding  8. SCREENING EXAM FOR SKIN CANCER  Generalized    Discussed/information given on safe sun practices and use of sunscreen, sun protective clothing or sun avoidance. Recommend to use over the counter medication of sunscreen with a SPF 30 or higher on a daily basis prior to sun exposure to reduce the risk of skin cancer.    Follow up in 6 months for FSE  Discussed if there are any changes or development of concerning symptoms (lesion/skin condition is changing, bleeding, enlarging, or worsening) the patient is to contact my office. The patient verbalizes understanding.     Cherelle Chow MD  7/15/2025

## 2025-07-15 NOTE — Clinical Note
Personal History of Non-Melanoma Skin Cancer, most recent SCC R proximal second finger May 2025  -Well healed scar(s) with no evidence of recurrence  -Discussed the need for annual or semi-annual skin examinations and to return sooner if any new or changing lesions are noticed. Patient verbalizes understanding

## 2025-07-15 NOTE — Clinical Note
-Discussed nature of diagnosis and treatment options  -When the rash is active, apply topical corticosteroids to the active areas of the rash as prescribed (Clobetasol)  -Recommend to use liberal emollients twice daily, one time applied immediately after shower while skin is still slightly damp. Use emollients to all areas of the body that may be affected and use whether the rash is active or not. Use prescription medications before applying emollients.  -Discussed/information given with/to the patient on the risks, benefits and alternatives of the usage of topical corticosteroids, including but not limited to: atrophy (thinning of the skin), striae (stretch marks), telangiectasia (blood vessel growth), and dyspigmentation (discoloration of the skin).  -Recommend to limit long-term use of topical corticosteroids to less than 14 days per month to reduce risk of side effects.  -Recommend:

## 2025-09-25 ENCOUNTER — APPOINTMENT (OUTPATIENT)
Dept: PRIMARY CARE | Facility: CLINIC | Age: 80
End: 2025-09-25
Payer: COMMERCIAL

## 2025-10-15 ENCOUNTER — APPOINTMENT (OUTPATIENT)
Dept: DERMATOLOGY | Facility: CLINIC | Age: 80
End: 2025-10-15
Payer: COMMERCIAL